# Patient Record
Sex: MALE | Race: WHITE | Employment: FULL TIME | URBAN - METROPOLITAN AREA
[De-identification: names, ages, dates, MRNs, and addresses within clinical notes are randomized per-mention and may not be internally consistent; named-entity substitution may affect disease eponyms.]

---

## 2020-05-23 ENCOUNTER — HOSPITAL ENCOUNTER (EMERGENCY)
Age: 45
Discharge: HOME | End: 2020-05-23
Attending: EMERGENCY MEDICINE
Payer: COMMERCIAL

## 2020-05-23 ENCOUNTER — HOSPITAL ENCOUNTER (EMERGENCY)
Age: 45
Discharge: HOME | End: 2020-05-23
Attending: NURSE PRACTITIONER
Payer: COMMERCIAL

## 2020-05-23 VITALS
HEART RATE: 75 BPM | OXYGEN SATURATION: 98 % | DIASTOLIC BLOOD PRESSURE: 80 MMHG | BODY MASS INDEX: 27.37 KG/M2 | TEMPERATURE: 97.9 F | SYSTOLIC BLOOD PRESSURE: 119 MMHG | WEIGHT: 180 LBS | RESPIRATION RATE: 16 BRPM

## 2020-05-23 VITALS
RESPIRATION RATE: 20 BRPM | OXYGEN SATURATION: 98 % | BODY MASS INDEX: 27.37 KG/M2 | SYSTOLIC BLOOD PRESSURE: 141 MMHG | DIASTOLIC BLOOD PRESSURE: 89 MMHG | HEART RATE: 98 BPM | TEMPERATURE: 98.5 F | WEIGHT: 180 LBS

## 2020-05-23 PROCEDURE — 6370000000 HC RX 637 (ALT 250 FOR IP): Performed by: EMERGENCY MEDICINE

## 2020-05-23 PROCEDURE — 99212 OFFICE O/P EST SF 10 MIN: CPT

## 2020-05-23 PROCEDURE — 93005 ELECTROCARDIOGRAM TRACING: CPT | Performed by: EMERGENCY MEDICINE

## 2020-05-23 PROCEDURE — 99284 EMERGENCY DEPT VISIT MOD MDM: CPT

## 2020-05-23 RX ORDER — LORAZEPAM 1 MG/1
1 TABLET ORAL ONCE
Status: COMPLETED | OUTPATIENT
Start: 2020-05-23 | End: 2020-05-23

## 2020-05-23 RX ADMIN — LORAZEPAM 1 MG: 1 TABLET ORAL at 12:25

## 2020-05-23 ASSESSMENT — ENCOUNTER SYMPTOMS
ABDOMINAL PAIN: 0
BACK PAIN: 0
COUGH: 0
SHORTNESS OF BREATH: 0

## 2020-05-24 LAB
EKG ATRIAL RATE: 71 BPM
EKG P AXIS: 63 DEGREES
EKG P-R INTERVAL: 140 MS
EKG Q-T INTERVAL: 386 MS
EKG QRS DURATION: 78 MS
EKG QTC CALCULATION (BAZETT): 419 MS
EKG R AXIS: -19 DEGREES
EKG T AXIS: 14 DEGREES
EKG VENTRICULAR RATE: 71 BPM

## 2020-05-24 PROCEDURE — 93010 ELECTROCARDIOGRAM REPORT: CPT | Performed by: INTERNAL MEDICINE

## 2020-08-27 VITALS
WEIGHT: 171 LBS | RESPIRATION RATE: 16 BRPM | DIASTOLIC BLOOD PRESSURE: 80 MMHG | BODY MASS INDEX: 26 KG/M2 | SYSTOLIC BLOOD PRESSURE: 120 MMHG | HEART RATE: 72 BPM

## 2020-08-27 RX ORDER — ATORVASTATIN CALCIUM 10 MG/1
10 TABLET, FILM COATED ORAL DAILY
COMMUNITY

## 2021-08-21 ENCOUNTER — HOSPITAL ENCOUNTER (EMERGENCY)
Age: 46
Discharge: HOME | End: 2021-08-21
Payer: COMMERCIAL

## 2021-08-21 VITALS
WEIGHT: 185 LBS | OXYGEN SATURATION: 95 % | RESPIRATION RATE: 16 BRPM | DIASTOLIC BLOOD PRESSURE: 71 MMHG | TEMPERATURE: 97.5 F | SYSTOLIC BLOOD PRESSURE: 103 MMHG | BODY MASS INDEX: 28.13 KG/M2 | HEART RATE: 85 BPM

## 2021-08-21 DIAGNOSIS — U07.1 COVID-19 VIRUS INFECTION: Primary | ICD-10-CM

## 2021-08-21 LAB
BACTERIA: ABNORMAL /HPF
BASOPHILS ABSOLUTE: 0.01 E9/L (ref 0–0.2)
BASOPHILS RELATIVE PERCENT: 0.3 % (ref 0–2)
BILIRUBIN URINE: NEGATIVE
BLOOD, URINE: NEGATIVE
CLARITY: CLEAR
COLOR: YELLOW
EOSINOPHILS ABSOLUTE: 0 E9/L (ref 0.05–0.5)
EOSINOPHILS RELATIVE PERCENT: 0 % (ref 0–6)
GFR AFRICAN AMERICAN: >60
GFR NON-AFRICAN AMERICAN: >60 ML/MIN/1.73
GLUCOSE BLD-MCNC: 100 MG/DL (ref 74–99)
GLUCOSE URINE: NEGATIVE MG/DL
HCT VFR BLD CALC: 40.9 % (ref 37–54)
HEMOGLOBIN: 13.7 G/DL (ref 12.5–16.5)
IMMATURE GRANULOCYTES #: 0.01 E9/L
IMMATURE GRANULOCYTES %: 0.3 % (ref 0–5)
KETONES, URINE: >=80 MG/DL
LEUKOCYTE ESTERASE, URINE: NEGATIVE
LYMPHOCYTES ABSOLUTE: 0.54 E9/L (ref 1.5–4)
LYMPHOCYTES RELATIVE PERCENT: 13.7 % (ref 20–42)
MCH RBC QN AUTO: 28.7 PG (ref 26–35)
MCHC RBC AUTO-ENTMCNC: 33.5 % (ref 32–34.5)
MCV RBC AUTO: 85.6 FL (ref 80–99.9)
MONOCYTES ABSOLUTE: 0.24 E9/L (ref 0.1–0.95)
MONOCYTES RELATIVE PERCENT: 6.1 % (ref 2–12)
MUCUS: PRESENT /LPF
NEUTROPHILS ABSOLUTE: 3.14 E9/L (ref 1.8–7.3)
NEUTROPHILS RELATIVE PERCENT: 79.6 % (ref 43–80)
NITRITE, URINE: NEGATIVE
PDW BLD-RTO: 12.1 FL (ref 11.5–15)
PERFORMED ON: ABNORMAL
PH UA: 6 (ref 5–9)
PLATELET # BLD: 162 E9/L (ref 130–450)
PMV BLD AUTO: 9.6 FL (ref 7–12)
POC CHLORIDE: 102 MMOL/L (ref 100–108)
POC CREATININE: 0.8 MG/DL (ref 0.7–1.2)
POC POTASSIUM: 4 MMOL/L (ref 3.5–5)
POC SODIUM: 139 MMOL/L (ref 132–146)
PROTEIN UA: ABNORMAL MG/DL
RBC # BLD: 4.78 E12/L (ref 3.8–5.8)
RBC # BLD: NORMAL 10*6/UL
RBC UA: ABNORMAL /HPF (ref 0–2)
SARS-COV-2, NAAT: DETECTED
SPECIFIC GRAVITY UA: 1.02 (ref 1–1.03)
UROBILINOGEN, URINE: 0.2 E.U./DL
WBC # BLD: 3.9 E9/L (ref 4.5–11.5)
WBC UA: ABNORMAL /HPF (ref 0–5)

## 2021-08-21 PROCEDURE — 81001 URINALYSIS AUTO W/SCOPE: CPT

## 2021-08-21 PROCEDURE — 82565 ASSAY OF CREATININE: CPT

## 2021-08-21 PROCEDURE — 82435 ASSAY OF BLOOD CHLORIDE: CPT

## 2021-08-21 PROCEDURE — 36415 COLL VENOUS BLD VENIPUNCTURE: CPT

## 2021-08-21 PROCEDURE — 84295 ASSAY OF SERUM SODIUM: CPT

## 2021-08-21 PROCEDURE — 82947 ASSAY GLUCOSE BLOOD QUANT: CPT

## 2021-08-21 PROCEDURE — 99211 OFF/OP EST MAY X REQ PHY/QHP: CPT

## 2021-08-21 PROCEDURE — 85025 COMPLETE CBC W/AUTO DIFF WBC: CPT

## 2021-08-21 PROCEDURE — 84132 ASSAY OF SERUM POTASSIUM: CPT

## 2021-08-21 PROCEDURE — 87635 SARS-COV-2 COVID-19 AMP PRB: CPT

## 2021-08-21 RX ORDER — DEXAMETHASONE 6 MG/1
6 TABLET ORAL DAILY
Qty: 5 TABLET | Refills: 0 | Status: ON HOLD | OUTPATIENT
Start: 2021-08-21 | End: 2021-08-28

## 2021-08-21 RX ORDER — AZITHROMYCIN 250 MG/1
TABLET, FILM COATED ORAL
Qty: 1 PACKET | Refills: 0 | Status: ON HOLD | OUTPATIENT
Start: 2021-08-21 | End: 2021-08-28 | Stop reason: HOSPADM

## 2021-08-21 NOTE — ED PROVIDER NOTES
[id number]  Department of Emergency Medicine  UC Encounter Note  21   11:10 AM EDT      NAME: Erica Pimentel  :  1975  MRN:  [id number]    Chief Complaint: Fever (for a week, higher at night low in the day time)      This is a 62-year-old male that presents to urgent care complaining of a persistent fever mostly in the evening times for the past week. He does state some fatigue and may be some mild body aches and did state earlier this week he had a Covid test at a pharmacy which was negative. He really does not complain of any other symptoms at this time. He denies any lightheadedness or dizziness. No chest pain or shortness of breath. No cough or URI symptoms or sinus symptoms. No nausea or vomiting diarrhea or urinary symptoms. On first contact patient he appears to be in no acute distress. Review of Systems  Pertinent positives and negatives are stated within HPI, all other systems reviewed and are negative. Physical Exam  Vitals and nursing note reviewed. Constitutional:       Appearance: He is well-developed. HENT:      Head: Normocephalic and atraumatic. Jaw: There is normal jaw occlusion. No trismus. Right Ear: Hearing, tympanic membrane, ear canal and external ear normal.      Left Ear: Hearing, tympanic membrane, ear canal and external ear normal.      Nose: Nose normal.      Right Sinus: No maxillary sinus tenderness or frontal sinus tenderness. Left Sinus: No maxillary sinus tenderness or frontal sinus tenderness. Mouth/Throat:      Pharynx: Oropharynx is clear. Uvula midline. No pharyngeal swelling, oropharyngeal exudate, posterior oropharyngeal erythema or uvula swelling. Tonsils: No tonsillar abscesses. Eyes:      General: Lids are normal.      Conjunctiva/sclera: Conjunctivae normal.      Pupils: Pupils are equal, round, and reactive to light.    Cardiovascular:      Rate and Rhythm: Normal rate and regular rhythm. Heart sounds: Normal heart sounds. No murmur heard. Pulmonary:      Effort: Pulmonary effort is normal.      Breath sounds: Normal breath sounds. Abdominal:      General: Bowel sounds are normal.      Palpations: Abdomen is soft. Abdomen is not rigid. Tenderness: There is no abdominal tenderness. There is no guarding or rebound. Musculoskeletal:      Cervical back: Normal range of motion and neck supple. Skin:     General: Skin is warm and dry. Findings: No abrasion or rash. Neurological:      Mental Status: He is alert and oriented to person, place, and time. GCS: GCS eye subscore is 4. GCS verbal subscore is 5. GCS motor subscore is 6. Cranial Nerves: No cranial nerve deficit. Sensory: Sensation is intact. No sensory deficit. Motor: Motor function is intact. Coordination: Coordination is intact. Coordination normal.      Gait: Gait is intact. Gait normal.         Procedures    MDM  Number of Diagnoses or Management Options  COVID-19 virus infection  Diagnosis management comments: Patient is in no acute distress. Basic lab work was reviewed. I did run a Covid test which was positive. His white blood count was decreased which corresponds with his positive Covid test.  But he looks well here. I stressed symptomatic treatment. I did add a steroid which would help with the body aches in particular but he was also concern about any worsening symptoms anyone to be covered for anything else so I did write the Decadron to help if he does have any problems breathing and he was worried about a secondary bacterial infection in the lungs which I stated his lungs sounded pretty clear but I will place him on a Zithromax antibiotic for now. I did tell him to follow-up with his primary care provider and to go to the ER if symptoms worsen.              --------------------------------------------- PAST HISTORY ---------------------------------------------  Past Medical History:  has a past medical history of GERD (gastroesophageal reflux disease), History of cardiovascular stress test, and Hyperlipidemia. Past Surgical History:  has no past surgical history on file. Social History:  reports that he has never smoked. He uses smokeless tobacco. He reports that he does not drink alcohol and does not use drugs. Family History: family history includes Coronary Art Dis in his father; Diabetes in his father; Other in his mother. The patients home medications have been reviewed. Allergies: Patient has no known allergies.     -------------------------------------------------- RESULTS -------------------------------------------------  Results for orders placed or performed during the hospital encounter of 08/21/21   COVID-19, Rapid    Specimen: Nasopharyngeal Swab   Result Value Ref Range    SARS-CoV-2, NAAT DETECTED (A) Not Detected   CBC Auto Differential   Result Value Ref Range    WBC 3.9 (L) 4.5 - 11.5 E9/L    RBC 4.78 3.80 - 5.80 E12/L    Hemoglobin 13.7 12.5 - 16.5 g/dL    Hematocrit 40.9 37.0 - 54.0 %    MCV 85.6 80.0 - 99.9 fL    MCH 28.7 26.0 - 35.0 pg    MCHC 33.5 32.0 - 34.5 %    RDW 12.1 11.5 - 15.0 fL    Platelets 431 562 - 602 E9/L    MPV 9.6 7.0 - 12.0 fL   Urinalysis   Result Value Ref Range    Color, UA Yellow Straw/Yellow    Clarity, UA Clear Clear    Glucose, Ur Negative Negative mg/dL    Bilirubin Urine Negative Negative    Ketones, Urine >=80 (A) Negative mg/dL    Specific Gravity, UA 1.020 1.005 - 1.030    Blood, Urine Negative Negative    pH, UA 6.0 5.0 - 9.0    Protein, UA TRACE Negative mg/dL    Urobilinogen, Urine 0.2 <2.0 E.U./dL    Nitrite, Urine Negative Negative    Leukocyte Esterase, Urine Negative Negative   Microscopic Urinalysis   Result Value Ref Range    Mucus, UA Present (A) None Seen /LPF    WBC, UA 2-5 0 - 5 /HPF    RBC, UA NONE 0 - 2 /HPF    Bacteria, UA NONE SEEN None Seen /HPF   POCT Venous   Result Value Ref Range    POC Sodium 139 132 - 146 mmol/L    POC Potassium 4.0 3.5 - 5.0 mmol/L    POC Chloride 102 100 - 108 mmol/L    POC Glucose 100 (H) 74 - 99 mg/dl    POC Creatinine 0.8 0.7 - 1.2 mg/dL    GFR Non-African American >60 >=60 mL/min/1.73    GFR  >60     Performed on SEE BELOW      No orders to display       ------------------------- NURSING NOTES AND VITALS REVIEWED ---------------------------   The nursing notes within the ED encounter and vital signs as below have been reviewed. /71   Pulse 85   Temp 97.5 °F (36.4 °C)   Resp 16   Wt 185 lb (83.9 kg)   SpO2 95%   BMI 28.13 kg/m²   Oxygen Saturation Interpretation: Normal      ------------------------------------------ PROGRESS NOTES ------------------------------------------   I have spoken with the patient and discussed todays results, in addition to providing specific details for the plan of care and counseling regarding the diagnosis and prognosis. Their questions are answered at this time and they are agreeable with the plan.      --------------------------------- ADDITIONAL PROVIDER NOTES ---------------------------------     This patient is stable for discharge. I have shared the specific conditions for return, as well as the importance of follow-up. * NOTE: This report was transcribed using voice recognition software.  Every effort was made to ensure accuracy; however, inadvertent computerized transcription errors may be present.    --------------------------------- IMPRESSION AND DISPOSITION ---------------------------------    IMPRESSION  1. COVID-19 virus infection        DISPOSITION  Disposition: Discharge to home  Patient condition is good       Chinmarquita Singh PA-C  08/21/21 9989

## 2021-08-23 ENCOUNTER — CARE COORDINATION (OUTPATIENT)
Dept: CARE COORDINATION | Age: 46
End: 2021-08-23

## 2021-08-23 ENCOUNTER — TELEPHONE (OUTPATIENT)
Dept: FAMILY MEDICINE CLINIC | Age: 46
End: 2021-08-23

## 2021-08-23 NOTE — TELEPHONE ENCOUNTER
----- Message from Neliori Epperson sent at 8/23/2021 10:25 AM EDT -----  Subject: Message to Provider    QUESTIONS  Information for Provider? pt called in to let Dr Taylor Lipscomb know he has   tested positive for covid on 8/21. pt had symptoms since monday. he said   he only had a fever, and can't seem to break it. pt unsure if he needs to   do anything more. looking for guidance on what he should be doing,   ---------------------------------------------------------------------------  --------------  CALL BACK INFO  What is the best way for the office to contact you? OK to leave message on   voicemail, OK to respond with electronic message via [employer] portal (only   for patients who have registered [employer] account)  Preferred Call Back Phone Number? [phone]  ---------------------------------------------------------------------------  --------------  SCRIPT ANSWERS  Relationship to Patient?  Self

## 2021-08-23 NOTE — CARE COORDINATION
Date/Time:  8/23/2021 9:29 AM  Attempted to reach patient by telephone. Call within 2 business days of discharge: Yes Left HIPPA compliant message requesting a return call. Will attempt to reach patient again.

## 2021-08-23 NOTE — CARE COORDINATION
Patient contacted regarding COVID-19 risk, exposure, diagnosis, pulse oximeter ordered at discharge and monoclonal antibody infusion follow up. Discussed COVID-19 related testing which was available at this time. Test results were positive. Patient informed of results, if available? Yes. LPN Care Coordinator contacted the patient by telephone to perform post discharge assessment. Call within 2 business days of discharge: Yes. Verified name and  with patient as identifiers. Provided introduction to self, and explanation of the CTN/ACM role, and reason for call due to risk factors for infection and/or exposure to COVID-19. Symptoms reviewed with patient who verbalized the following symptoms: fever, fatigue and pain or aching joints. Due to no new or worsening symptoms encounter was not routed to provider for escalation. Discussed follow-up appointments. If no appointment was previously scheduled, appointment scheduling offered: Yes. [facility] follow up appointment(s): No future appointments. Non-[facility] follow up appointment(s): pt states he is still having a fever was advised to call pcp     Non-face-to-face services provided:  Obtained and reviewed discharge summary and/or continuity of care documents     Advance Care Planning:   Does patient have an Advance Directive:  reviewed and needs to be updated. Educated patient about risk for severe COVID-19 due to risk factors according to CDC guidelines. LPN CC reviewed discharge instructions, medical action plan and red flag symptoms with the patient who verbalized understanding. Discussed COVID vaccination status: No. Education provided on COVID-19 vaccination as appropriate. Discussed exposure protocols and quarantine with CDC Guidelines. Patient was given an opportunity to verbalize any questions and concerns and agrees to contact ACM or health care provider for questions related to their healthcare.     Reviewed and educated patient on any new and changed medications related to discharge diagnosis     Was patient discharged with a pulse oximeter? No Discussed and confirmed pulse oximeter discharge instructions and when to notify provider or seek emergency care. LPN CC provided contact information. Plan for follow-up call in 1-2 days based on severity of symptoms and risk factors.

## 2021-08-23 NOTE — TELEPHONE ENCOUNTER
I spoke to patient, he denies any shortness of breath, denies any cough, no nausea vomiting diarrhea,  Advised to take vitamin C, zinc, Tylenol, fluids,  He has ordered the pulse ox  Go to ER if O2 sat less than 88%, or shortness of breath,  Or high fevers not improving

## 2021-08-24 ENCOUNTER — APPOINTMENT (OUTPATIENT)
Dept: CT IMAGING | Age: 46
DRG: 137 | End: 2021-08-24
Payer: COMMERCIAL

## 2021-08-24 ENCOUNTER — HOSPITAL ENCOUNTER (INPATIENT)
Age: 46
LOS: 4 days | Discharge: HOME | DRG: 137 | End: 2021-08-28
Attending: EMERGENCY MEDICINE | Admitting: INTERNAL MEDICINE
Payer: COMMERCIAL

## 2021-08-24 DIAGNOSIS — U07.1 COVID-19: Primary | ICD-10-CM

## 2021-08-24 DIAGNOSIS — J18.9 PNEUMONIA DUE TO INFECTIOUS ORGANISM, UNSPECIFIED LATERALITY, UNSPECIFIED PART OF LUNG: ICD-10-CM

## 2021-08-24 DIAGNOSIS — R09.02 HYPOXIA: ICD-10-CM

## 2021-08-24 DIAGNOSIS — U07.1 COVID-19 VIRUS INFECTION: ICD-10-CM

## 2021-08-24 PROBLEM — J12.82 PNEUMONIA DUE TO COVID-19 VIRUS: Status: ACTIVE | Noted: 2021-08-24

## 2021-08-24 LAB
ALBUMIN SERPL-MCNC: 3.6 G/DL (ref 3.5–5.2)
ALP BLD-CCNC: 64 U/L (ref 40–129)
ALT SERPL-CCNC: 61 U/L (ref 0–40)
ANION GAP SERPL CALCULATED.3IONS-SCNC: 12 MMOL/L (ref 7–16)
APTT: 29.6 SEC (ref 24.5–35.1)
AST SERPL-CCNC: 44 U/L (ref 0–39)
BASOPHILS ABSOLUTE: 0 E9/L (ref 0–0.2)
BASOPHILS RELATIVE PERCENT: 0.2 % (ref 0–2)
BILIRUB SERPL-MCNC: 0.4 MG/DL (ref 0–1.2)
BUN BLDV-MCNC: 8 MG/DL (ref 6–20)
BURR CELLS: ABNORMAL
CALCIUM SERPL-MCNC: 8.3 MG/DL (ref 8.6–10.2)
CHLORIDE BLD-SCNC: 97 MMOL/L (ref 98–107)
CO2: 26 MMOL/L (ref 22–29)
CREAT SERPL-MCNC: 0.8 MG/DL (ref 0.7–1.2)
DOHLE BODIES: ABNORMAL
EOSINOPHILS ABSOLUTE: 0 E9/L (ref 0.05–0.5)
EOSINOPHILS RELATIVE PERCENT: 0 % (ref 0–6)
GFR AFRICAN AMERICAN: >60
GFR NON-AFRICAN AMERICAN: >60 ML/MIN/1.73
GLUCOSE BLD-MCNC: 111 MG/DL (ref 74–99)
HCT VFR BLD CALC: 39.7 % (ref 37–54)
HEMOGLOBIN: 13.7 G/DL (ref 12.5–16.5)
INR BLD: 1.1
LACTIC ACID, SEPSIS: 1.1 MMOL/L (ref 0.5–1.9)
LYMPHOCYTES ABSOLUTE: 0.2 E9/L (ref 1.5–4)
LYMPHOCYTES RELATIVE PERCENT: 3.5 % (ref 20–42)
MAGNESIUM: 2.1 MG/DL (ref 1.6–2.6)
MCH RBC QN AUTO: 29.4 PG (ref 26–35)
MCHC RBC AUTO-ENTMCNC: 34.5 % (ref 32–34.5)
MCV RBC AUTO: 85.2 FL (ref 80–99.9)
MONOCYTES ABSOLUTE: 0.2 E9/L (ref 0.1–0.95)
MONOCYTES RELATIVE PERCENT: 4.3 % (ref 2–12)
NEUTROPHILS ABSOLUTE: 4.69 E9/L (ref 1.8–7.3)
NEUTROPHILS RELATIVE PERCENT: 92.2 % (ref 43–80)
OVALOCYTES: ABNORMAL
PDW BLD-RTO: 12.1 FL (ref 11.5–15)
PLATELET # BLD: 262 E9/L (ref 130–450)
PMV BLD AUTO: 9.7 FL (ref 7–12)
POIKILOCYTES: ABNORMAL
POTASSIUM SERPL-SCNC: 3.7 MMOL/L (ref 3.5–5)
PROTHROMBIN TIME: 12.9 SEC (ref 9.3–12.4)
RBC # BLD: 4.66 E12/L (ref 3.8–5.8)
SODIUM BLD-SCNC: 135 MMOL/L (ref 132–146)
SPHEROCYTES: ABNORMAL
TOTAL PROTEIN: 6.5 G/DL (ref 6.4–8.3)
TROPONIN, HIGH SENSITIVITY: 7 NG/L (ref 0–11)
WBC # BLD: 5.1 E9/L (ref 4.5–11.5)

## 2021-08-24 PROCEDURE — 2580000003 HC RX 258: Performed by: EMERGENCY MEDICINE

## 2021-08-24 PROCEDURE — 6360000004 HC RX CONTRAST MEDICATION: Performed by: RADIOLOGY

## 2021-08-24 PROCEDURE — 6370000000 HC RX 637 (ALT 250 FOR IP): Performed by: EMERGENCY MEDICINE

## 2021-08-24 PROCEDURE — 96361 HYDRATE IV INFUSION ADD-ON: CPT

## 2021-08-24 PROCEDURE — 84484 ASSAY OF TROPONIN QUANT: CPT

## 2021-08-24 PROCEDURE — 99222 1ST HOSP IP/OBS MODERATE 55: CPT | Performed by: INTERNAL MEDICINE

## 2021-08-24 PROCEDURE — 94664 DEMO&/EVAL PT USE INHALER: CPT

## 2021-08-24 PROCEDURE — 99284 EMERGENCY DEPT VISIT MOD MDM: CPT

## 2021-08-24 PROCEDURE — 83735 ASSAY OF MAGNESIUM: CPT

## 2021-08-24 PROCEDURE — 87040 BLOOD CULTURE FOR BACTERIA: CPT

## 2021-08-24 PROCEDURE — 6360000002 HC RX W HCPCS: Performed by: EMERGENCY MEDICINE

## 2021-08-24 PROCEDURE — 85025 COMPLETE CBC W/AUTO DIFF WBC: CPT

## 2021-08-24 PROCEDURE — 2060000000 HC ICU INTERMEDIATE R&B

## 2021-08-24 PROCEDURE — 85610 PROTHROMBIN TIME: CPT

## 2021-08-24 PROCEDURE — 83605 ASSAY OF LACTIC ACID: CPT

## 2021-08-24 PROCEDURE — 94640 AIRWAY INHALATION TREATMENT: CPT

## 2021-08-24 PROCEDURE — 85730 THROMBOPLASTIN TIME PARTIAL: CPT

## 2021-08-24 PROCEDURE — 96374 THER/PROPH/DIAG INJ IV PUSH: CPT

## 2021-08-24 PROCEDURE — 93005 ELECTROCARDIOGRAM TRACING: CPT | Performed by: EMERGENCY MEDICINE

## 2021-08-24 PROCEDURE — 80053 COMPREHEN METABOLIC PANEL: CPT

## 2021-08-24 PROCEDURE — 71275 CT ANGIOGRAPHY CHEST: CPT

## 2021-08-24 RX ORDER — ACETAMINOPHEN 325 MG/1
650 TABLET ORAL ONCE
Status: COMPLETED | OUTPATIENT
Start: 2021-08-24 | End: 2021-08-24

## 2021-08-24 RX ORDER — ATORVASTATIN CALCIUM 20 MG/1
10 TABLET, FILM COATED ORAL NIGHTLY
Status: DISCONTINUED | OUTPATIENT
Start: 2021-08-24 | End: 2021-08-28 | Stop reason: HOSPADM

## 2021-08-24 RX ORDER — 0.9 % SODIUM CHLORIDE 0.9 %
30 INTRAVENOUS SOLUTION INTRAVENOUS ONCE
Status: COMPLETED | OUTPATIENT
Start: 2021-08-24 | End: 2021-08-24

## 2021-08-24 RX ORDER — ONDANSETRON 2 MG/ML
4 INJECTION INTRAMUSCULAR; INTRAVENOUS EVERY 6 HOURS PRN
Status: DISCONTINUED | OUTPATIENT
Start: 2021-08-24 | End: 2021-08-28 | Stop reason: HOSPADM

## 2021-08-24 RX ORDER — DEXAMETHASONE SODIUM PHOSPHATE 10 MG/ML
6 INJECTION, SOLUTION INTRAMUSCULAR; INTRAVENOUS ONCE
Status: COMPLETED | OUTPATIENT
Start: 2021-08-25 | End: 2021-08-25

## 2021-08-24 RX ORDER — ACETAMINOPHEN 650 MG/1
650 SUPPOSITORY RECTAL EVERY 6 HOURS PRN
Status: DISCONTINUED | OUTPATIENT
Start: 2021-08-24 | End: 2021-08-25 | Stop reason: SDUPTHER

## 2021-08-24 RX ORDER — ONDANSETRON 4 MG/1
4 TABLET, ORALLY DISINTEGRATING ORAL EVERY 8 HOURS PRN
Status: DISCONTINUED | OUTPATIENT
Start: 2021-08-24 | End: 2021-08-28 | Stop reason: HOSPADM

## 2021-08-24 RX ORDER — ACETAMINOPHEN 325 MG/1
650 TABLET ORAL EVERY 6 HOURS PRN
Status: DISCONTINUED | OUTPATIENT
Start: 2021-08-24 | End: 2021-08-25 | Stop reason: SDUPTHER

## 2021-08-24 RX ORDER — IPRATROPIUM BROMIDE AND ALBUTEROL SULFATE 2.5; .5 MG/3ML; MG/3ML
2 SOLUTION RESPIRATORY (INHALATION) ONCE
Status: COMPLETED | OUTPATIENT
Start: 2021-08-24 | End: 2021-08-24

## 2021-08-24 RX ORDER — SODIUM CHLORIDE 0.9 % (FLUSH) 0.9 %
5-40 SYRINGE (ML) INJECTION EVERY 12 HOURS SCHEDULED
Status: DISCONTINUED | OUTPATIENT
Start: 2021-08-24 | End: 2021-08-28 | Stop reason: HOSPADM

## 2021-08-24 RX ORDER — SODIUM CHLORIDE 9 MG/ML
25 INJECTION, SOLUTION INTRAVENOUS PRN
Status: DISCONTINUED | OUTPATIENT
Start: 2021-08-24 | End: 2021-08-28 | Stop reason: HOSPADM

## 2021-08-24 RX ORDER — POLYETHYLENE GLYCOL 3350 17 G/17G
17 POWDER, FOR SOLUTION ORAL DAILY PRN
Status: DISCONTINUED | OUTPATIENT
Start: 2021-08-24 | End: 2021-08-28 | Stop reason: HOSPADM

## 2021-08-24 RX ORDER — SODIUM CHLORIDE 0.9 % (FLUSH) 0.9 %
5-40 SYRINGE (ML) INJECTION PRN
Status: DISCONTINUED | OUTPATIENT
Start: 2021-08-24 | End: 2021-08-28 | Stop reason: HOSPADM

## 2021-08-24 RX ORDER — DEXAMETHASONE SODIUM PHOSPHATE 10 MG/ML
10 INJECTION, SOLUTION INTRAMUSCULAR; INTRAVENOUS ONCE
Status: COMPLETED | OUTPATIENT
Start: 2021-08-24 | End: 2021-08-24

## 2021-08-24 RX ADMIN — SODIUM CHLORIDE 2448 ML: 9 INJECTION, SOLUTION INTRAVENOUS at 17:44

## 2021-08-24 RX ADMIN — AZITHROMYCIN 500 MG: 500 INJECTION, POWDER, LYOPHILIZED, FOR SOLUTION INTRAVENOUS at 23:10

## 2021-08-24 RX ADMIN — DEXAMETHASONE SODIUM PHOSPHATE 10 MG: 10 INJECTION, SOLUTION INTRAMUSCULAR; INTRAVENOUS at 17:46

## 2021-08-24 RX ADMIN — IPRATROPIUM BROMIDE AND ALBUTEROL SULFATE 2 AMPULE: .5; 3 SOLUTION RESPIRATORY (INHALATION) at 18:09

## 2021-08-24 RX ADMIN — WATER 2000 MG: 1 INJECTION INTRAMUSCULAR; INTRAVENOUS; SUBCUTANEOUS at 22:55

## 2021-08-24 RX ADMIN — ACETAMINOPHEN 650 MG: 325 TABLET ORAL at 17:59

## 2021-08-24 RX ADMIN — IOPAMIDOL 75 ML: 755 INJECTION, SOLUTION INTRAVENOUS at 19:38

## 2021-08-24 ASSESSMENT — ENCOUNTER SYMPTOMS
VOMITING: 0
SORE THROAT: 0
RHINORRHEA: 0
WHEEZING: 0
SINUS PRESSURE: 0
ABDOMINAL PAIN: 0
NAUSEA: 1
COUGH: 1
BACK PAIN: 0
DIARRHEA: 0
SHORTNESS OF BREATH: 0

## 2021-08-24 ASSESSMENT — PAIN SCALES - GENERAL: PAINLEVEL_OUTOF10: 0

## 2021-08-24 NOTE — ED PROVIDER NOTES
Chief complaint:  Fever    HPI History provided by the patient  Patient comes in complaining of 1 week of mild cough with nausea and dry heaves with persistent fevers, not staying down at home. Has body aches and general fatigue with it. No abdominal pain. No specific chest pain or shortness of breath. No headache or stiff neck. Tested positive for Covid 3 days ago. Unvaccinated. No particular treatment prior to arrival.  States he really does not feel short of breath is more fatigued. No leg pain or swelling. No history of blood clots. Review of Systems   Constitutional: Positive for chills, diaphoresis, fatigue and fever. HENT: Positive for congestion. Negative for ear pain, rhinorrhea, sinus pressure and sore throat. Respiratory: Positive for cough. Negative for shortness of breath and wheezing. Cardiovascular: Negative for chest pain, palpitations and leg swelling. Gastrointestinal: Positive for nausea. Negative for abdominal pain, diarrhea and vomiting. Genitourinary: Negative for dysuria, flank pain, frequency and hematuria. Musculoskeletal: Positive for myalgias. Negative for arthralgias, back pain, gait problem, joint swelling, neck pain and neck stiffness. Skin: Negative for rash and wound. Neurological: Negative for dizziness, seizures, syncope, weakness, light-headedness, numbness and headaches. Hematological: Negative for adenopathy. All other systems reviewed and are negative. Physical Exam  Vitals and nursing note reviewed. Constitutional:       General: He is not in acute distress. Appearance: He is well-developed. He is not ill-appearing, toxic-appearing or diaphoretic. HENT:      Head: Normocephalic and atraumatic. Nose: Mucosal edema present. Mouth/Throat:      Pharynx: Oropharynx is clear. Uvula midline. No pharyngeal swelling, oropharyngeal exudate, posterior oropharyngeal erythema or uvula swelling.       Tonsils: No tonsillar exudate or tonsillar abscesses. Comments: No trismus or stridor  Eyes:      Pupils: Pupils are equal, round, and reactive to light. Neck:      Trachea: Trachea and phonation normal.      Comments: No trismus, no stridor, no adenopathy or meningeal signs. Cardiovascular:      Rate and Rhythm: Regular rhythm. Tachycardia present. Heart sounds: Normal heart sounds. No murmur heard. Pulmonary:      Effort: Pulmonary effort is normal. No respiratory distress. Breath sounds: Normal breath sounds. No stridor, decreased air movement or transmitted upper airway sounds. No decreased breath sounds, wheezing, rhonchi or rales. Comments: Mild scattered adventitial sounds with no respiratory distress. Chest:      Chest wall: No tenderness. Abdominal:      General: Bowel sounds are normal. There is no distension. Palpations: Abdomen is soft. Tenderness: There is no abdominal tenderness. There is no right CVA tenderness, left CVA tenderness, guarding or rebound. Musculoskeletal:         General: No swelling, tenderness, deformity or signs of injury. Cervical back: Normal range of motion and neck supple. No signs of trauma or rigidity. No pain with movement, spinous process tenderness or muscular tenderness. Normal range of motion. Right lower leg: No edema. Left lower leg: No edema. Comments: No pretibial edema or calf pain. Lymphadenopathy:      Cervical: No cervical adenopathy. Skin:     General: Skin is warm and dry. Coloration: Skin is not jaundiced or pale. Findings: No erythema or rash. Neurological:      General: No focal deficit present. Mental Status: He is alert and oriented to person, place, and time. GCS: GCS eye subscore is 4. GCS verbal subscore is 5. GCS motor subscore is 6. Cranial Nerves: No cranial nerve deficit.       Coordination: Coordination normal.          Procedures     [facility]     ED Course as of Aug 24 2108   Tue Aug 24, 2021 2017 Patient up and ambulated to the restroom in no acute distress. [NC]   2027 Patient laying the bed resting comfortably no acute distress at this time although is on nasal cannula oxygen with a pulse ox of about 93%. He is speaking in full sentences. He is updated on work-up results and admission. [NC]   2107 Case discussed with Dr. Stephy Harding, detailed overview given, they will admit the patient. [NC]      ED Course User Index  [NC] Gerry Schofield DO     EKG Interpretation    Interpreted by emergency department physician    Rhythm: normal sinus   Rate: 94  Axis: normal  Ectopy: none  Conduction: normal  ST Segments: no acute change  T Waves: no acute change  Q Waves: none    Clinical Impression: no acute changes    Gerry Schofield DO    ED Course as of Aug 24 2109   Tue Aug 24, 2021   2017 Patient up and ambulated to the restroom in no acute distress. [NC]   2027 Patient laying the bed resting comfortably no acute distress at this time although is on nasal cannula oxygen with a pulse ox of about 93%. He is speaking in full sentences. He is updated on work-up results and admission. [NC]   2107 Case discussed with Dr. Stephy Harding, detailed overview given, they will admit the patient. [NC]      ED Course User Index  [NC] Melany Nixon, DO       --------------------------------------------- PAST HISTORY ---------------------------------------------  Past Medical History:  has a past medical history of GERD (gastroesophageal reflux disease), History of cardiovascular stress test, and Hyperlipidemia. Past Surgical History:  has no past surgical history on file. Social History:  reports that he has never smoked. He uses smokeless tobacco. He reports that he does not drink alcohol and does not use drugs. Family History: family history includes Coronary Art Dis in his father; Diabetes in his father; Other in his mother.      The patients home medications have been reviewed. Allergies: Patient has no known allergies.     -------------------------------------------------- RESULTS -------------------------------------------------    LABS:  Results for orders placed or performed during the hospital encounter of 08/24/21   Lactate, Sepsis   Result Value Ref Range    Lactic Acid, Sepsis 1.1 0.5 - 1.9 mmol/L   CBC Auto Differential   Result Value Ref Range    WBC 5.1 4.5 - 11.5 E9/L    RBC 4.66 3.80 - 5.80 E12/L    Hemoglobin 13.7 12.5 - 16.5 g/dL    Hematocrit 39.7 37.0 - 54.0 %    MCV 85.2 80.0 - 99.9 fL    MCH 29.4 26.0 - 35.0 pg    MCHC 34.5 32.0 - 34.5 %    RDW 12.1 11.5 - 15.0 fL    Platelets 109 804 - 154 E9/L    MPV 9.7 7.0 - 12.0 fL    Neutrophils % 92.2 (H) 43.0 - 80.0 %    Lymphocytes % 3.5 (L) 20.0 - 42.0 %    Monocytes % 4.3 2.0 - 12.0 %    Eosinophils % 0.0 0.0 - 6.0 %    Basophils % 0.2 0.0 - 2.0 %    Neutrophils Absolute 4.69 1.80 - 7.30 E9/L    Lymphocytes Absolute 0.20 (L) 1.50 - 4.00 E9/L    Monocytes Absolute 0.20 0.10 - 0.95 E9/L    Eosinophils Absolute 0.00 (L) 0.05 - 0.50 E9/L    Basophils Absolute 0.00 0.00 - 0.20 E9/L    Dohle Bodies 1+     Poikilocytes 1+     Ronny Cells 1+     Ovalocytes 1+     Spherocytes 1+    Comprehensive Metabolic Panel   Result Value Ref Range    Sodium 135 132 - 146 mmol/L    Potassium 3.7 3.5 - 5.0 mmol/L    Chloride 97 (L) 98 - 107 mmol/L    CO2 26 22 - 29 mmol/L    Anion Gap 12 7 - 16 mmol/L    Glucose 111 (H) 74 - 99 mg/dL    BUN 8 6 - 20 mg/dL    CREATININE 0.8 0.7 - 1.2 mg/dL    GFR Non-African American >60 >=60 mL/min/1.73    GFR African American >60     Calcium 8.3 (L) 8.6 - 10.2 mg/dL    Total Protein 6.5 6.4 - 8.3 g/dL    Albumin 3.6 3.5 - 5.2 g/dL    Total Bilirubin 0.4 0.0 - 1.2 mg/dL    Alkaline Phosphatase 64 40 - 129 U/L    ALT 61 (H) 0 - 40 U/L    AST 44 (H) 0 - 39 U/L   Protime-INR   Result Value Ref Range    Protime 12.9 (H) 9.3 - 12.4 sec    INR 1.1    APTT   Result Value Ref Range    aPTT 29.6 24.5 - 35.1 sec Magnesium   Result Value Ref Range    Magnesium 2.1 1.6 - 2.6 mg/dL   Troponin   Result Value Ref Range    Troponin, High Sensitivity 7 0 - 11 ng/L       RADIOLOGY:  CTA CHEST W CONTRAST   Final Result   No evidence of pulmonary embolism. Bilateral pneumonia, worse on the left.                 ------------------------- NURSING NOTES AND VITALS REVIEWED ---------------------------  Date / Time Roomed:  8/24/2021  4:28 PM  ED Bed Assignment:  16/16    The nursing notes within the ED encounter and vital signs as below have been reviewed. Patient Vitals for the past 24 hrs:   BP Temp Temp src Pulse Resp SpO2 Height Weight   08/24/21 1749 132/83 103 °F (39.4 °C) Oral 103 16 93 %     08/24/21 1608       5' 8\" (1.727 m) 180 lb (81.6 kg)   08/24/21 1607 (!) 146/78   105 18 (!) 88 %     08/24/21 1604  99.1 °F (37.3 °C) Infrared 108  92 %         Oxygen Saturation Interpretation: Abnormal and Improved after treatment    ------------------------------------------ PROGRESS NOTES ------------------------------------------      Counseling:  I have spoken with the patient and discussed todays results, in addition to providing specific details for the plan of care and counseling regarding the diagnosis and prognosis. Their questions are answered at this time and they are agreeable with the plan of admission.    --------------------------------- ADDITIONAL PROVIDER NOTES ---------------------------------    This patient's ED course included: a personal history and physicial examination, re-evaluation prior to disposition, multiple bedside re-evaluations, IV medications, cardiac monitoring and continuous pulse oximetry    This patient has remained hemodynamically stable during their ED course. Diagnosis:  1. COVID-19    2. Pneumonia due to infectious organism, unspecified laterality, unspecified part of lung    3.  Hypoxia        Disposition:  Patient's disposition: Admit to IMCU  Patient's condition is

## 2021-08-25 PROBLEM — J96.01 ACUTE HYPOXEMIC RESPIRATORY FAILURE DUE TO COVID-19 (HCC): Status: ACTIVE | Noted: 2021-08-25

## 2021-08-25 PROBLEM — U07.1 ACUTE HYPOXEMIC RESPIRATORY FAILURE DUE TO COVID-19 (HCC): Status: ACTIVE | Noted: 2021-08-25

## 2021-08-25 LAB
ANION GAP SERPL CALCULATED.3IONS-SCNC: 12 MMOL/L (ref 7–16)
BUN BLDV-MCNC: 9 MG/DL (ref 6–20)
CALCIUM SERPL-MCNC: 8.1 MG/DL (ref 8.6–10.2)
CHLORIDE BLD-SCNC: 102 MMOL/L (ref 98–107)
CO2: 21 MMOL/L (ref 22–29)
CREAT SERPL-MCNC: 0.7 MG/DL (ref 0.7–1.2)
EKG ATRIAL RATE: 94 BPM
EKG P AXIS: 33 DEGREES
EKG P-R INTERVAL: 120 MS
EKG Q-T INTERVAL: 366 MS
EKG QRS DURATION: 76 MS
EKG QTC CALCULATION (BAZETT): 457 MS
EKG R AXIS: -4 DEGREES
EKG T AXIS: 8 DEGREES
EKG VENTRICULAR RATE: 94 BPM
GFR AFRICAN AMERICAN: >60
GFR NON-AFRICAN AMERICAN: >60 ML/MIN/1.73
GLUCOSE BLD-MCNC: 152 MG/DL (ref 74–99)
HCT VFR BLD CALC: 35.1 % (ref 37–54)
HEMOGLOBIN: 11.9 G/DL (ref 12.5–16.5)
MCH RBC QN AUTO: 29.1 PG (ref 26–35)
MCHC RBC AUTO-ENTMCNC: 33.9 % (ref 32–34.5)
MCV RBC AUTO: 85.8 FL (ref 80–99.9)
PDW BLD-RTO: 12.1 FL (ref 11.5–15)
PLATELET # BLD: 253 E9/L (ref 130–450)
PMV BLD AUTO: 9.7 FL (ref 7–12)
POTASSIUM REFLEX MAGNESIUM: 3.8 MMOL/L (ref 3.5–5)
PROCALCITONIN: 0.14 NG/ML (ref 0–0.08)
RBC # BLD: 4.09 E12/L (ref 3.8–5.8)
SODIUM BLD-SCNC: 135 MMOL/L (ref 132–146)
WBC # BLD: 3 E9/L (ref 4.5–11.5)

## 2021-08-25 PROCEDURE — 85027 COMPLETE CBC AUTOMATED: CPT

## 2021-08-25 PROCEDURE — 2580000003 HC RX 258: Performed by: INTERNAL MEDICINE

## 2021-08-25 PROCEDURE — 6360000002 HC RX W HCPCS: Performed by: INTERNAL MEDICINE

## 2021-08-25 PROCEDURE — 2060000000 HC ICU INTERMEDIATE R&B

## 2021-08-25 PROCEDURE — 2500000003 HC RX 250 WO HCPCS: Performed by: INTERNAL MEDICINE

## 2021-08-25 PROCEDURE — 6370000000 HC RX 637 (ALT 250 FOR IP): Performed by: INTERNAL MEDICINE

## 2021-08-25 PROCEDURE — 86140 C-REACTIVE PROTEIN: CPT

## 2021-08-25 PROCEDURE — 84145 PROCALCITONIN (PCT): CPT

## 2021-08-25 PROCEDURE — 80048 BASIC METABOLIC PNL TOTAL CA: CPT

## 2021-08-25 PROCEDURE — 36415 COLL VENOUS BLD VENIPUNCTURE: CPT

## 2021-08-25 RX ORDER — GUAIFENESIN/DEXTROMETHORPHAN 100-10MG/5
5 SYRUP ORAL EVERY 4 HOURS PRN
Status: DISCONTINUED | OUTPATIENT
Start: 2021-08-25 | End: 2021-08-28 | Stop reason: HOSPADM

## 2021-08-25 RX ORDER — ASCORBIC ACID 500 MG
500 TABLET ORAL 2 TIMES DAILY
Status: DISCONTINUED | OUTPATIENT
Start: 2021-08-25 | End: 2021-08-28 | Stop reason: HOSPADM

## 2021-08-25 RX ORDER — SODIUM CHLORIDE 9 MG/ML
INJECTION, SOLUTION INTRAVENOUS CONTINUOUS
Status: ACTIVE | OUTPATIENT
Start: 2021-08-25 | End: 2021-08-25

## 2021-08-25 RX ORDER — ZINC SULFATE 50(220)MG
50 CAPSULE ORAL DAILY
Status: DISCONTINUED | OUTPATIENT
Start: 2021-08-25 | End: 2021-08-28 | Stop reason: HOSPADM

## 2021-08-25 RX ORDER — BUDESONIDE AND FORMOTEROL FUMARATE DIHYDRATE 160; 4.5 UG/1; UG/1
2 AEROSOL RESPIRATORY (INHALATION) 2 TIMES DAILY
Status: DISCONTINUED | OUTPATIENT
Start: 2021-08-25 | End: 2021-08-28 | Stop reason: HOSPADM

## 2021-08-25 RX ORDER — LANOLIN ALCOHOL/MO/W.PET/CERES
50 CREAM (GRAM) TOPICAL DAILY
Status: DISCONTINUED | OUTPATIENT
Start: 2021-08-25 | End: 2021-08-28 | Stop reason: HOSPADM

## 2021-08-25 RX ORDER — ACETAMINOPHEN 650 MG/1
650 SUPPOSITORY RECTAL EVERY 6 HOURS PRN
Status: DISCONTINUED | OUTPATIENT
Start: 2021-08-25 | End: 2021-08-28 | Stop reason: HOSPADM

## 2021-08-25 RX ORDER — VITAMIN B COMPLEX
2000 TABLET ORAL DAILY
Status: DISCONTINUED | OUTPATIENT
Start: 2021-08-25 | End: 2021-08-25 | Stop reason: SDUPTHER

## 2021-08-25 RX ORDER — VITAMIN B COMPLEX
2000 TABLET ORAL DAILY
Status: DISCONTINUED | OUTPATIENT
Start: 2021-08-26 | End: 2021-08-28 | Stop reason: HOSPADM

## 2021-08-25 RX ORDER — DEXAMETHASONE SODIUM PHOSPHATE 10 MG/ML
6 INJECTION, SOLUTION INTRAMUSCULAR; INTRAVENOUS EVERY 24 HOURS
Status: DISCONTINUED | OUTPATIENT
Start: 2021-08-26 | End: 2021-08-28 | Stop reason: HOSPADM

## 2021-08-25 RX ORDER — 0.9 % SODIUM CHLORIDE 0.9 %
30 INTRAVENOUS SOLUTION INTRAVENOUS PRN
Status: DISCONTINUED | OUTPATIENT
Start: 2021-08-25 | End: 2021-08-28 | Stop reason: HOSPADM

## 2021-08-25 RX ORDER — ACETAMINOPHEN 325 MG/1
650 TABLET ORAL EVERY 6 HOURS PRN
Status: DISCONTINUED | OUTPATIENT
Start: 2021-08-25 | End: 2021-08-28 | Stop reason: HOSPADM

## 2021-08-25 RX ADMIN — ATORVASTATIN CALCIUM 10 MG: 20 TABLET, FILM COATED ORAL at 01:08

## 2021-08-25 RX ADMIN — ZINC SULFATE 220 MG (50 MG) CAPSULE 50 MG: CAPSULE at 10:29

## 2021-08-25 RX ADMIN — ENOXAPARIN SODIUM 40 MG: 40 INJECTION SUBCUTANEOUS at 01:08

## 2021-08-25 RX ADMIN — ATORVASTATIN CALCIUM 10 MG: 20 TABLET, FILM COATED ORAL at 20:41

## 2021-08-25 RX ADMIN — PYRIDOXINE HCL TAB 50 MG 50 MG: 50 TAB at 10:29

## 2021-08-25 RX ADMIN — AZITHROMYCIN DIHYDRATE 500 MG: 500 INJECTION, POWDER, LYOPHILIZED, FOR SOLUTION INTRAVENOUS at 23:13

## 2021-08-25 RX ADMIN — SODIUM CHLORIDE 1000 MG: 9 INJECTION INTRAMUSCULAR; INTRAVENOUS; SUBCUTANEOUS at 23:13

## 2021-08-25 RX ADMIN — Medication 10 ML: at 20:42

## 2021-08-25 RX ADMIN — DEXAMETHASONE SODIUM PHOSPHATE 6 MG: 10 INJECTION, SOLUTION INTRAMUSCULAR; INTRAVENOUS at 20:41

## 2021-08-25 RX ADMIN — OXYCODONE HYDROCHLORIDE AND ACETAMINOPHEN 500 MG: 500 TABLET ORAL at 20:41

## 2021-08-25 RX ADMIN — REMDESIVIR 200 MG: 100 INJECTION, POWDER, LYOPHILIZED, FOR SOLUTION INTRAVENOUS at 12:20

## 2021-08-25 RX ADMIN — ENOXAPARIN SODIUM 30 MG: 30 INJECTION SUBCUTANEOUS at 20:42

## 2021-08-25 RX ADMIN — SODIUM CHLORIDE: 9 INJECTION, SOLUTION INTRAVENOUS at 09:07

## 2021-08-25 RX ADMIN — Medication 2000 UNITS: at 10:29

## 2021-08-25 RX ADMIN — OXYCODONE HYDROCHLORIDE AND ACETAMINOPHEN 500 MG: 500 TABLET ORAL at 10:29

## 2021-08-25 ASSESSMENT — ENCOUNTER SYMPTOMS: TACHYPNEA: 1

## 2021-08-25 ASSESSMENT — PAIN SCALES - GENERAL: PAINLEVEL_OUTOF10: 0

## 2021-08-25 NOTE — ED NOTES
Registration notified that Dr Sebastian Williamson will assume care.      Joyce Linares RN  08/25/21 0575

## 2021-08-25 NOTE — CARE COORDINATION
Contacted by Dr. Dominga Ocasio from ER. Patient requested to change to Dr Earnest Ceron service effective immediately. I left orders in place from [facility] as well as my orders from earlier this morning. Dr. Debbie Cabrales is free to contact me with any questions but I did not evaluate patient face to face; I had received sign out from [facility] and reviewed chart and placed some orders earlier today.     Electronically signed by Dennis Lopes MD on 8/25/2021 at 1:43 PM

## 2021-08-25 NOTE — ED NOTES
Dr. Eloise Bennett notified of change in pt's condition. Ordered that respiratory therapy follow their protocol.      Theta File, RN  08/25/21 [id number]

## 2021-08-25 NOTE — ACP (ADVANCE CARE PLANNING)
Advance Care Planning     Advance Care Planning Activator (Inpatient)  Conversation Note      Date of ACP Conversation: 8/25/2021     Conversation Conducted with: Patient      ACP Activator: ANGELA Welch      Health Care Decision Maker:      Patient then wife. Care Preferences    Ventilation: \"If you were in your present state of health and suddenly became very ill and were unable to breathe on your own, what would your preference be about the use of a ventilator (breathing machine) if it were available to you? \"      Would the patient desire the use of ventilator (breathing machine)?: yes    \"If your health worsens and it becomes clear that your chance of recovery is unlikely, what would your preference be about the use of a ventilator (breathing machine) if it were available to you? \"     Would the patient desire the use of ventilator (breathing machine)?: Yes      Resuscitation  \"CPR works best to restart the heart when there is a sudden event, like a heart attack, in someone who is otherwise healthy. Unfortunately, CPR does not typically restart the heart for people who have serious health conditions or who are very sick. \"    \"In the event your heart stopped as a result of an underlying serious health condition, would you want attempts to be made to restart your heart (answer \"yes\" for attempt to resuscitate) or would you prefer a natural death (answer \"no\" for do not attempt to resuscitate)? \" yes       [] Yes   [] No   Educated Patient / Kristen Roldan regarding differences between Advance Directives and portable DNR orders.     Length of ACP Conversation in minutes:      Conversation Outcomes:  [x] ACP discussion completed  [] Existing advance directive reviewed with patient; no changes to patient's previously recorded wishes  [] New Advance Directive completed  [] Portable Do Not Rescitate prepared for Provider review and signature  [] POLST/POST/MOLST/MOST prepared for Provider review and signature      Follow-up plan:    [] Schedule follow-up conversation to continue planning  [] Referred individual to Provider for additional questions/concerns   [] Advised patient/agent/surrogate to review completed ACP document and update if needed with changes in condition, patient preferences or care setting    [x] This note routed to one or more involved healthcare providers    {

## 2021-08-25 NOTE — H&P
[facility] History and Physical      CHIEF COMPLAINT: Fever    History of Present Illness: Patient is a 62-year-old male with past medical history significant for GERD, hyperlipidemia. He presented to the emergency department after being diagnosed with COVID-19 2 days ago. Today, he is complaining of worsening cough, nausea, fever, body aches, fatigue. He denies significant shortness of breath. He just feels generally unwell and uncomfortable. His ED work-up was significant for CT of the chest showing no evidence of PE, but bilateral pneumonia. He was started on IV Rocephin and Zithromax. Medicine was asked admit for further treatment of acute hypoxic respiratory failure, COVID-19 pneumonia, possible superimposed bacterial pneumonia. REVIEW OF SYSTEMS:  A comprehensive review of systems was negative except for: what is in the HPI    PMH:  Past Medical History:   Diagnosis Date    GERD (gastroesophageal reflux disease)     History of cardiovascular stress test 12/2014    nuclear treadmill stress    Hyperlipidemia      Surgical History:  No past surgical history on file. Medications Prior to Admission:    Prior to Admission medications    Medication Sig Start Date End Date Taking? Authorizing Provider   dexamethasone (DECADRON) 6 MG tablet Take 1 tablet by mouth daily for 5 days 8/21/21 8/26/21  Jasbir Tejada PA-C   azithromycin (ZITHROMAX Z-MICKI) 250 MG tablet Take 2 tabs on day one, followed by 1 tablet daily for 4 days. 8/21/21 8/31/21  Bryan Tejada PA-C   atorvastatin (LIPITOR) 10 MG tablet Take 10 mg by mouth daily    Historical Provider, MD       Allergies:    Patient has no known allergies. Social History:    reports that he has never smoked. He uses smokeless tobacco. He reports that he does not drink alcohol and does not use drugs. Family History:   family history includes Coronary Art Dis in his father; Diabetes in his father; Other in his mother. Result   No evidence of pulmonary embolism. Bilateral pneumonia, worse on the left. ASSESSMENT:      Active Problems:    Pneumonia due to COVID-19 virus  Resolved Problems:    * No resolved hospital problems. *    PLAN:    COVID-19 pneumonia  Acute hypoxic respiratory failure  82-year-old male diagnosed with COVID-19 2 days ago, presenting to the emergency department with complaints of fever, chills, body aches, nausea. Denies significant shortness of breath, but is found to be hypoxic on room air and placed on supplemental oxygen in the ED. His work-up is otherwise significant for a CTA of the chest with no evidence of PE, but with evidence of bilateral pneumonia. There was concern for possible superimposed bacterial pneumonia, so he was started on Rocephin and Zithromax.  -Admit to medical unit  -Supplemental oxygen, wean as tolerated  -Start remdesivir  -Continue Decadron daily  -Continue empiric antibiotics for now  -Follow cultures    Hyperlipidemia  -Continue home dose of atorvastatin    Code Status: Full  DVT prophylaxis: Lovenox      NOTE: This report was transcribed using voice recognition software. Every effort was made to ensure accuracy; however, inadvertent computerized transcription errors may be present.   Electronically signed by Yue Reyes DO on 8/25/2021 at 12:16 AM

## 2021-08-25 NOTE — CARE COORDINATION
SS Note: Covid positive 8/21/21. Pt in ED and in isolation, SW called pt in room and completed ACP and transition of care. Pt stated he resides with his wife and children in a two story home, bedroom and bathroom both on second floor, two steps to enter home. Stated was independent prior to admission including employment and driving. Stated no past SNF, HHC, or DME. Stated his PCP is Dr. Sudha Farias. Patient has prescription coverage, uses [employer] [facility]. Pt plans to return home upon discharge, wife will transport home, denies any needs including HHC.   Electronically signed by ANGELA Edward on 8/25/2021 at 1:52 PM

## 2021-08-26 LAB
ALBUMIN SERPL-MCNC: 3.2 G/DL (ref 3.5–5.2)
ALP BLD-CCNC: 54 U/L (ref 40–129)
ALT SERPL-CCNC: 65 U/L (ref 0–40)
ANION GAP SERPL CALCULATED.3IONS-SCNC: 10 MMOL/L (ref 7–16)
APTT: 25.9 SEC (ref 24.5–35.1)
AST SERPL-CCNC: 41 U/L (ref 0–39)
BASOPHILS ABSOLUTE: 0 E9/L (ref 0–0.2)
BASOPHILS RELATIVE PERCENT: 0 % (ref 0–2)
BILIRUB SERPL-MCNC: 0.2 MG/DL (ref 0–1.2)
BUN BLDV-MCNC: 12 MG/DL (ref 6–20)
BURR CELLS: ABNORMAL
C-REACTIVE PROTEIN: 2.8 MG/DL (ref 0–0.4)
C-REACTIVE PROTEIN: 4.2 MG/DL (ref 0–0.4)
CALCIUM SERPL-MCNC: 8.4 MG/DL (ref 8.6–10.2)
CHLORIDE BLD-SCNC: 102 MMOL/L (ref 98–107)
CHOLESTEROL, TOTAL: 140 MG/DL (ref 0–199)
CO2: 26 MMOL/L (ref 22–29)
CREAT SERPL-MCNC: 0.7 MG/DL (ref 0.7–1.2)
D DIMER: 311 NG/ML DDU
EOSINOPHILS ABSOLUTE: 0 E9/L (ref 0.05–0.5)
EOSINOPHILS RELATIVE PERCENT: 0 % (ref 0–6)
FERRITIN: 1985 NG/ML
FIBRINOGEN: >700 MG/DL (ref 225–540)
GFR AFRICAN AMERICAN: >60
GFR NON-AFRICAN AMERICAN: >60 ML/MIN/1.73
GLUCOSE BLD-MCNC: 137 MG/DL (ref 74–99)
HBA1C MFR BLD: 5.6 % (ref 4–5.6)
HCT VFR BLD CALC: 38.4 % (ref 37–54)
HDLC SERPL-MCNC: 40 MG/DL
HEMOGLOBIN: 12.8 G/DL (ref 12.5–16.5)
INR BLD: 1
LACTATE DEHYDROGENASE: 439 U/L (ref 135–225)
LACTIC ACID: 1 MMOL/L (ref 0.5–2.2)
LDL CHOLESTEROL CALCULATED: 75 MG/DL (ref 0–99)
LYMPHOCYTES ABSOLUTE: 0.24 E9/L (ref 1.5–4)
LYMPHOCYTES RELATIVE PERCENT: 2.6 % (ref 20–42)
MCH RBC QN AUTO: 29 PG (ref 26–35)
MCHC RBC AUTO-ENTMCNC: 33.3 % (ref 32–34.5)
MCV RBC AUTO: 87.1 FL (ref 80–99.9)
MONOCYTES ABSOLUTE: 0.16 E9/L (ref 0.1–0.95)
MONOCYTES RELATIVE PERCENT: 1.7 % (ref 2–12)
NEUTROPHILS ABSOLUTE: 7.58 E9/L (ref 1.8–7.3)
NEUTROPHILS RELATIVE PERCENT: 95.7 % (ref 43–80)
PDW BLD-RTO: 12.2 FL (ref 11.5–15)
PLATELET # BLD: 318 E9/L (ref 130–450)
PMV BLD AUTO: 9.7 FL (ref 7–12)
POIKILOCYTES: ABNORMAL
POLYCHROMASIA: ABNORMAL
POTASSIUM REFLEX MAGNESIUM: 4 MMOL/L (ref 3.5–5)
PROCALCITONIN: 0.11 NG/ML (ref 0–0.08)
PROTHROMBIN TIME: 12 SEC (ref 9.3–12.4)
RBC # BLD: 4.41 E12/L (ref 3.8–5.8)
SODIUM BLD-SCNC: 138 MMOL/L (ref 132–146)
TOTAL PROTEIN: 6.4 G/DL (ref 6.4–8.3)
TRIGL SERPL-MCNC: 124 MG/DL (ref 0–149)
TROPONIN, HIGH SENSITIVITY: <6 NG/L (ref 0–11)
VITAMIN D 25-HYDROXY: 20 NG/ML (ref 30–100)
VLDLC SERPL CALC-MCNC: 25 MG/DL
WBC # BLD: 7.9 E9/L (ref 4.5–11.5)

## 2021-08-26 PROCEDURE — 2500000003 HC RX 250 WO HCPCS: Performed by: INTERNAL MEDICINE

## 2021-08-26 PROCEDURE — 36415 COLL VENOUS BLD VENIPUNCTURE: CPT

## 2021-08-26 PROCEDURE — 80061 LIPID PANEL: CPT

## 2021-08-26 PROCEDURE — 85610 PROTHROMBIN TIME: CPT

## 2021-08-26 PROCEDURE — 6370000000 HC RX 637 (ALT 250 FOR IP): Performed by: INTERNAL MEDICINE

## 2021-08-26 PROCEDURE — 6360000002 HC RX W HCPCS: Performed by: INTERNAL MEDICINE

## 2021-08-26 PROCEDURE — 85378 FIBRIN DEGRADE SEMIQUANT: CPT

## 2021-08-26 PROCEDURE — 94640 AIRWAY INHALATION TREATMENT: CPT

## 2021-08-26 PROCEDURE — 85384 FIBRINOGEN ACTIVITY: CPT

## 2021-08-26 PROCEDURE — 2580000003 HC RX 258: Performed by: INTERNAL MEDICINE

## 2021-08-26 PROCEDURE — 82306 VITAMIN D 25 HYDROXY: CPT

## 2021-08-26 PROCEDURE — 83605 ASSAY OF LACTIC ACID: CPT

## 2021-08-26 PROCEDURE — 84145 PROCALCITONIN (PCT): CPT

## 2021-08-26 PROCEDURE — 82728 ASSAY OF FERRITIN: CPT

## 2021-08-26 PROCEDURE — 85730 THROMBOPLASTIN TIME PARTIAL: CPT

## 2021-08-26 PROCEDURE — 83036 HEMOGLOBIN GLYCOSYLATED A1C: CPT

## 2021-08-26 PROCEDURE — 80053 COMPREHEN METABOLIC PANEL: CPT

## 2021-08-26 PROCEDURE — 2060000000 HC ICU INTERMEDIATE R&B

## 2021-08-26 PROCEDURE — 83615 LACTATE (LD) (LDH) ENZYME: CPT

## 2021-08-26 PROCEDURE — 86140 C-REACTIVE PROTEIN: CPT

## 2021-08-26 PROCEDURE — 85025 COMPLETE CBC W/AUTO DIFF WBC: CPT

## 2021-08-26 PROCEDURE — 84484 ASSAY OF TROPONIN QUANT: CPT

## 2021-08-26 RX ADMIN — DEXAMETHASONE SODIUM PHOSPHATE 6 MG: 10 INJECTION, SOLUTION INTRAMUSCULAR; INTRAVENOUS at 08:19

## 2021-08-26 RX ADMIN — SODIUM CHLORIDE 1000 MG: 9 INJECTION INTRAMUSCULAR; INTRAVENOUS; SUBCUTANEOUS at 23:09

## 2021-08-26 RX ADMIN — ATORVASTATIN CALCIUM 10 MG: 20 TABLET, FILM COATED ORAL at 20:54

## 2021-08-26 RX ADMIN — ENOXAPARIN SODIUM 30 MG: 30 INJECTION SUBCUTANEOUS at 20:54

## 2021-08-26 RX ADMIN — REMDESIVIR 100 MG: 100 INJECTION, POWDER, LYOPHILIZED, FOR SOLUTION INTRAVENOUS at 11:56

## 2021-08-26 RX ADMIN — PYRIDOXINE HCL TAB 50 MG 50 MG: 50 TAB at 08:19

## 2021-08-26 RX ADMIN — BUDESONIDE AND FORMOTEROL FUMARATE DIHYDRATE 2 PUFF: 160; 4.5 AEROSOL RESPIRATORY (INHALATION) at 06:51

## 2021-08-26 RX ADMIN — Medication 10 ML: at 08:22

## 2021-08-26 RX ADMIN — AZITHROMYCIN DIHYDRATE 500 MG: 500 INJECTION, POWDER, LYOPHILIZED, FOR SOLUTION INTRAVENOUS at 23:09

## 2021-08-26 RX ADMIN — OXYCODONE HYDROCHLORIDE AND ACETAMINOPHEN 500 MG: 500 TABLET ORAL at 08:17

## 2021-08-26 RX ADMIN — ZINC SULFATE 220 MG (50 MG) CAPSULE 50 MG: CAPSULE at 08:19

## 2021-08-26 RX ADMIN — Medication 10 ML: at 20:56

## 2021-08-26 RX ADMIN — ENOXAPARIN SODIUM 30 MG: 30 INJECTION SUBCUTANEOUS at 08:16

## 2021-08-26 RX ADMIN — ACETAMINOPHEN 650 MG: 325 TABLET ORAL at 21:17

## 2021-08-26 RX ADMIN — Medication 2000 UNITS: at 08:18

## 2021-08-26 RX ADMIN — OXYCODONE HYDROCHLORIDE AND ACETAMINOPHEN 500 MG: 500 TABLET ORAL at 20:55

## 2021-08-26 ASSESSMENT — ENCOUNTER SYMPTOMS
SHORTNESS OF BREATH: 1
COUGH: 1

## 2021-08-26 ASSESSMENT — PAIN SCALES - GENERAL
PAINLEVEL_OUTOF10: 4
PAINLEVEL_OUTOF10: 0

## 2021-08-26 NOTE — CONSULTS
[facility]  Consult Note    [address] CARE [facility], [address]  Phone [phone]   [phone]      Admit Date: 2021  4:28 PM  Pt Name: Malena Danielle  MRN: [id number]  : 1975  Reason for Consult:    Chief Complaint   Patient presents with    Positive For Covid-19     fever, tylenol/motrin not working, pulse ox at yesi 86% at rest, nausea     Requesting Physician:  Namrata Stuart DO  PCP: Richa Ramachandran MD  History Obtained From:  patient, chart   ID consulted for Hypoxia [R09.02]  Pneumonia due to infectious organism, unspecified laterality, unspecified part of lung [J18.9]  Pneumonia due to COVID-19 virus [U07.1, J12.82]  COVID-19 [U07.1]  on hospital day 1  CHIEF COMPLAINT       Chief Complaint   Patient presents with    Positive For Covid-19     fever, tylenol/motrin not working, pulse ox at yesi 86% at rest, nausea     HISTORYOF PRESENT ILLNESS   Malena Danielle is a 42 y.o. male who presents with significant past medical history of  has a past medical history of GERD (gastroesophageal reflux disease), History of cardiovascular stress test, and Hyperlipidemia.    ED TRIAGEVITALS  BP: 118/64, Temp: 98.4 °F (36.9 °C), Pulse: 80, Resp: 22, SpO2: 91 %  HPI PT WITH HYPERLIPIDEMIA   PT TESTED +COVID UNVACCINATED EXPOSURE FROM WIFE WHO WORKS AT A HIGH SCHOOL  HIS CHILDREN ARE NEG   HE HAS DRY COUGH WITH NAUSEA ND DRY HEAVES/FEVERS/CHILLS/MYALGIA  CT CHEST NEG PE  WBC5.1 CR0.8 ALT61 AST44    REVIEW OF SYSTEMS     CONSTITUTIONAL:   No fever, chills, weight loss  ALLERGIES:    No urticaria, hay fever,    EYES:     No blurry vision, loss of vision,eye pain  ENT:      No hearing loss, sore throat  CARDIOVASCULAR:  No chest pain or palpitations  RESPIRATORY:   No cough, sob  ENDOCRINE:    No increase thirst, urination   HEME-LYMPH:   No easy bruising or bleeding  GI:     No nausea, vomiting or diarrhea  :     No urinary complaints  NEURO:    No seizures, stroke, HA  MUSCULOSKELETAL:  No muscle aches or pain, no joint pain  SKIN:     No rash or itch  PSYCH:    No depression or anxiety    Medications Prior to Admission: dexamethasone (DECADRON) 6 MG tablet, Take 1 tablet by mouth daily for 5 days  azithromycin (ZITHROMAX Z-MICKI) 250 MG tablet, Take 2 tabs on day one, followed by 1 tablet daily for 4 days.   atorvastatin (LIPITOR) 10 MG tablet, Take 10 mg by mouth daily  CURRENT MEDICATIONS     Current Facility-Administered Medications:     cefTRIAXone (ROCEPHIN) 1,000 mg in sodium chloride (PF) 10 mL IV syringe, 1,000 mg, IntraVENous, Q24H, Yair Mancilla DO    azithromycin (ZITHROMAX) 500 mg in D5W 250ml Vial Mate, 500 mg, IntraVENous, Q24H, Yair Mancilla DO    [COMPLETED] remdesivir 200 mg in sodium chloride 0.9 % 250 mL IVPB, 200 mg, IntraVENous, Once, Stopped at 08/25/21 1258 **FOLLOWED BY** [START ON 8/26/2021] remdesivir 100 mg in sodium chloride 0.9 % 250 mL IVPB, 100 mg, IntraVENous, Q24H, Yair Mancilla DO    0.9 % sodium chloride bolus, 30 mL, IntraVENous, PRN, Steffi Garces DO    vitamin B-6 (PYRIDOXINE) tablet 50 mg, 50 mg, Oral, Daily, Teo Somers MD, 50 mg at 08/25/21 1029    ascorbic acid (VITAMIN C) tablet 500 mg, 500 mg, Oral, BID, Teo Somers MD, 500 mg at 08/25/21 2041    zinc sulfate (ZINCATE) capsule 50 mg, 50 mg, Oral, Daily, Teo Somers MD, 50 mg at 08/25/21 1029    acetaminophen (TYLENOL) tablet 650 mg, 650 mg, Oral, Q6H PRN **OR** acetaminophen (TYLENOL) suppository 650 mg, 650 mg, Rectal, Q6H PRN, Sylvain Michelle MD    guaiFENesin-dextromethorphan (ROBITUSSIN DM) 100-10 MG/5ML syrup 5 mL, 5 mL, Oral, Q4H PRN, MD [place] ON 8/26/2021] Vitamin D (CHOLECALCIFEROL) tablet 2,000 Units, 2,000 Units, Oral, Daily, MD [place] ON 8/26/2021] dexamethasone (PF) (DECADRON) injection 6 mg, 6 mg, IntraVENous, Q24H, Sylvain Michelle MD    enoxaparin (LOVENOX) injection 30 mg, 30 mg, Subcutaneous, BID, Sylvain Michelle MD, 30 mg at 08/25/21 2042    budesonide-formoterol (SYMBICORT) 160-4.5 MCG/ACT inhaler 2 puff, 2 puff, Inhalation, BID, Sylvain Michelle MD    atorvastatin (LIPITOR) tablet 10 mg, 10 mg, Oral, Nightly, Steffi Sparkser, , 10 mg at 08/25/21 2041    sodium chloride flush 0.9 % injection 5-40 mL, 5-40 mL, IntraVENous, 2 times per day, Steffi Sparkser, DO, 10 mL at 08/25/21 2042    sodium chloride flush 0.9 % injection 5-40 mL, 5-40 mL, IntraVENous, PRN, Steffi Sparkser, DO    0.9 % sodium chloride infusion, 25 mL, IntraVENous, PRN, Steffi Venturazier, DO    ondansetron (ZOFRAN-ODT) disintegrating tablet 4 mg, 4 mg, Oral, Q8H PRN **OR** ondansetron (ZOFRAN) injection 4 mg, 4 mg, IntraVENous, Q6H PRN, Steffi Venturazier, DO    polyethylene glycol (GLYCOLAX) packet 17 g, 17 g, Oral, Daily PRN, Steffi Garces, DO  ALLERGIES     Patient has no known allergies. There is no immunization history on file for this patient. PAST MEDICAL HISTORY     Past Medical History:   Diagnosis Date    GERD (gastroesophageal reflux disease)     History of cardiovascular stress test 12/2014    nuclear treadmill stress    Hyperlipidemia      SURGICAL HISTORY     No past surgical history on file.   FAMILY HISTORY       Family History   Problem Relation Age of Onset    Other Mother         RA    Diabetes Father     Coronary Art Dis Father      SOCIAL HISTORY       Social History     Socioeconomic History    Marital status:      Spouse name: Not on file    Number of children: Not on file    Years of education: Not on file    Highest education level: Not on file   Occupational History    Not on file   Tobacco Use    Smoking status: Never Smoker    Smokeless tobacco: Current User   Substance and Sexual Activity    Alcohol use: No    Drug use: No    Sexual activity: Yes     Partners: Female   Other Topics Concern    Not on file   Social History Narrative    Not on file     Social Determinants of Health     Financial Resource Strain:     Difficulty of Paying Living Expenses:    Food Insecurity:     Worried About Running Out of Food in the Last Year:     [address] in the Last Year:    Transportation Needs:     Lack of Transportation (Medical):  Lack of Transportation (Non-Medical):    Physical Activity:     Days of Exercise per Week:     Minutes of Exercise per Session:    Stress:     Feeling of Stress :    Social Connections:     Frequency of Communication with Friends and Family:     Frequency of Social Gatherings with Friends and Family:     Attends [employer] Services:     Active Member of Clubs or Organizations:     Attends Club or Organization Meetings:     Marital Status:    Intimate Partner Violence:     Fear of Current or Ex-Partner:     Emotionally Abused:     Physically Abused:     Sexually Abused:      · [employer] [address]       ED Triage Vitals   BP Temp Temp Source Pulse Resp SpO2 Height Weight   08/24/21 1607 08/24/21 1604 08/24/21 1604 08/24/21 1604 08/24/21 1607 08/24/21 1604 08/24/21 1608 08/24/21 1608   (!) 146/78 99.1 °F (37.3 °C) Infrared 108 18 92 % 5' 8\" (1.727 m) 180 lb (81.6 kg)     Vitals:    Vitals:    08/25/21 1255 08/25/21 1620 08/25/21 1851 08/25/21 1930   BP: 108/73 114/74 113/74 118/64   Pulse: 85 75 76 80   Resp: 24 28 (!) 34 22   Temp: 98.7 °F (37.1 °C) 98.6 °F (37 °C)  98.4 °F (36.9 °C)   TempSrc: Oral Oral  Oral   SpO2: 95% 93% 93% 91%   Weight:       Height:         Physical Exam   Constitutional/General: Alert and oriented, NAD  Head: NC/AT  Eyes: PERRL, EOMI  Mouth: Normal mucosa, no thrush   Neck: Supple, full ROM,    Pulmonary: Lungs DECto auscultation bilaterally. Not in respiratory distress  Cardiovascular:  Regular rate and rhythm, no murmurs, gallops, or rubs. Abdomen: Soft, + BS.    distension. Nontender. Extremities: Moves all extremities x 4.    Warm and well perfused  Pulses:  Distal pulses intact  Skin: Warm and dry without rash  Neurologic:    No focal deficits  Psych: Normal Affect     DIAGNOSTIC RESULTS   RADIOLOGY:   CTA CHEST W CONTRAST    Result Date: 8/24/2021  EXAMINATION: CTA OF THE CHEST 8/24/2021 7:33 pm TECHNIQUE: CTA of the chest was performed after the administration of intravenous contrast.  Multiplanar reformatted images are provided for review. MIP images are provided for review. Dose modulation, iterative reconstruction, and/or weight based adjustment of the mA/kV was utilized to reduce the radiation dose to as low as reasonably achievable. COMPARISON: None. HISTORY: ORDERING SYSTEM PROVIDED HISTORY: covid, hypoxic, rule out PE TECHNOLOGIST PROVIDED HISTORY: Reason for exam:->covid, hypoxic, rule out PE Decision Support Exception - unselect if not a suspected or confirmed emergency medical condition->Emergency Medical Condition (MA) FINDINGS: Pulmonary Arteries: Pulmonary arteries are adequately opacified for evaluation. No evidence of intraluminal filling defect to suggest pulmonary embolism. Main pulmonary artery is normal in caliber. Mediastinum: No evidence of mediastinal lymphadenopathy. The heart and pericardium demonstrate no acute abnormality. There is no acute abnormality of the thoracic aorta. Lungs/pleura: The lungs demonstrate bilateral infiltrates, worse on the left. No evidence of pleural effusion or pneumothorax. Upper Abdomen: Limited images of the upper abdomen are unremarkable. Soft Tissues/Bones: No acute bone or soft tissue abnormality. No evidence of pulmonary embolism. Bilateral pneumonia, worse on the left.      LABS  Recent Labs     08/24/21  1724 08/25/21  0544   WBC 5.1 3.0*   HGB 13.7 11.9*   HCT 39.7 35.1*   MCV 85.2 85.8    253     Recent Labs     08/24/21  1724 08/24/21  1724 08/25/21  0544     --  135   K 3.7  --  3.8   CL 97*   < > 102   CO2 26   < > 21*   BUN 8  --  9   CREATININE 0.8  --  0.7   GFRAA >60  --  >60   LABGLOM >60  --  >60   GLUCOSE 111*  --  152*   PROT 6.5  --   --    LABALBU 3.6  --   --    CALCIUM 8.3*  --  8.1*   BILITOT 0.4  --   --    ALKPHOS 64  --   --    AST 44*  --   --    ALT 61*  --   --     < > = values in this interval not displayed. Recent Labs     08/25/21  1550   PROCAL 0.14*        Lab Results   Component Value Date    COVID19 DETECTED 08/21/2021     COVID-19/KRIS-COV2 LABS  Recent Labs     08/24/21  1724 08/25/21  1550   PROCAL  --  0.14*   INR 1.1  --    PROTIME 12.9*  --    AST 44*  --    ALT 61*  --      Lab Results   Component Value Date    CHOL 220 12/02/2015    TRIG 191 12/02/2015    HDL 60 05/11/2017    LDLCALC 181 05/11/2017    LABVLDL 19 05/11/2017        MICROBIOLOGY:     Cultures :   Lab Results   Component Value Date    BC 24 Hours no growth 08/24/2021     Lab Results   Component Value Date    BLOODCULT2 24 Hours no growth 08/24/2021        Patient is a 42 y.o. male who presented with   Chief Complaint   Patient presents with    Positive For Covid-19     fever, tylenol/motrin not working, pulse ox at yesi 86% at rest, nausea        FINAL IMPRESSION    FEVERS  LEUKOPENIA   TRANSAMINITIS  1. COVID-19    2. Pneumonia due to infectious organism, unspecified laterality, unspecified part of lung    3.  Hypoxia           cefTRIAXone (ROCEPHIN) 1,000 mg in sodium chloride (PF) 10 mL IV syringe, Q24H  azithromycin (ZITHROMAX) 500 mg in D5W 250ml Vial Mate, Q24H  [START ON 8/26/2021] remdesivir 100 mg in sodium chloride 0.9 % 250 mL IVPB, Q24H  0.9 % sodium chloride bolus, PRN  vitamin B-6 (PYRIDOXINE) tablet 50 mg, Daily  ascorbic acid (VITAMIN C) tablet 500 mg, BID  zinc sulfate (ZINCATE) capsule 50 mg, Daily     [START ON 8/26/2021] Vitamin D (CHOLECALCIFEROL) tablet 2,000 Units, Daily  [START ON 8/26/2021] dexamethasone (PF) (DECADRON) injection 6 mg, Q24H  enoxaparin (LOVENOX) injection 30 mg, BID       DISCUSSED PRONING/IS  CHECK HEMOGLOBIN A1C/LIPID PANEL     Imaging and labs were reviewed per medical records and any ID pertinent labs were addressed with the patient. The patient/FAMILY  was educated about the diagnosis, prognosis, indications, risks and benefits of treatment. An opportunity to ask questions was given to the patient/FAMILY and questions were answered. Thank you for involving me in the care of Houghton Pro. Please do not hesitate to call for any questions or concerns.          Electronically signed by Loc Diallo MD on 8/25/2021 at 8:50 PM

## 2021-08-26 NOTE — PROGRESS NOTES
[facility]  [facility]  Progress Note    NAME: Annie Sanchez  MR:  [id number]  :   1975  DATE OF SERVICE:21    This is a face to face encounter with Annie Sanchez 42 y.o. male on 21    CHIEF COMPLAINT     ID following for   Chief Complaint   Patient presents with    Positive For Covid-19     fever, tylenol/motrin not working, pulse ox at yesi 86% at rest, nausea     HISTORY OF PRESENT ILLNESS   Pt seen and examined  21  Pt in bed on o2 feels less sob with exertion    Patient is tolerating medications. No reported adverse drug reactions. REVIEW OF SYSTEMS     As stated above in the chief complaint, otherwise negative. CURRENT MEDICATIONS      cefTRIAXone (ROCEPHIN) IV  1,000 mg IntraVENous Q24H    azithromycin  500 mg IntraVENous Q24H    remdesivir IVPB  100 mg IntraVENous Q24H    vitamin B-6  50 mg Oral Daily    ascorbic acid  500 mg Oral BID    zinc sulfate  50 mg Oral Daily    Vitamin D  2,000 Units Oral Daily    dexamethasone  6 mg IntraVENous Q24H    enoxaparin  30 mg Subcutaneous BID    budesonide-formoterol  2 puff Inhalation BID    atorvastatin  10 mg Oral Nightly    sodium chloride flush  5-40 mL IntraVENous 2 times per day     Continuous Infusions:   sodium chloride       PRN Meds:sodium chloride, acetaminophen **OR** acetaminophen, guaiFENesin-dextromethorphan, sodium chloride flush, sodium chloride, ondansetron **OR** ondansetron, polyethylene glycol    PHYSICAL EXAM     /63   Pulse 63   Temp 98.1 °F (36.7 °C)   Resp 16   Ht 5' 8\" (1.727 m)   Wt 180 lb (81.6 kg)   SpO2 92%   BMI 27.37 kg/m²   Temp  Av.3 °F (36.8 °C)  Min: 98.1 °F (36.7 °C)  Max: 98.6 °F (37 °C)  Constitutional:  The patient is awake, alert, and oriented. Skin:    Warm and dry. No rashes were noted. HEENT:   Round and reactive pupils. AT/NC  Neck:    Supple to movements. Chest:   No use of accessory muscles to breathe.  Symmetrical expansion. Cardiovascular:  S1 and S2 are rhythmic and regular. No murmurs appreciated. Abdomen:   Positive bowel sounds to auscultation. Benign to palpation. Extremities:   No clubbing, no cyanosis, no edema. CNS    AAxO   Lines: piv      DIAGNOSTIC RESULTS   Radiology:    Recent Labs     08/24/21  1724 08/25/21  0544 08/26/21  0628   WBC 5.1 3.0* 7.9   RBC 4.66 4.09 4.41   HGB 13.7 11.9* 12.8   HCT 39.7 35.1* 38.4   MCV 85.2 85.8 87.1   MCH 29.4 29.1 29.0   MCHC 34.5 33.9 33.3   RDW 12.1 12.1 12.2    253 318   MPV 9.7 9.7 9.7     Recent Labs     08/24/21  1724 08/25/21  0544 08/26/21  0628    135 138   K 3.7 3.8 4.0   CL 97* 102 102   CO2 26 21* 26   BUN 8 9 12   CREATININE 0.8 0.7 0.7   GLUCOSE 111* 152* 137*   PROT 6.5  --  6.4   LABALBU 3.6  --  3.2*   CALCIUM 8.3* 8.1* 8.4*   BILITOT 0.4  --  0.2   ALKPHOS 64  --  54   AST 44*  --  41*   ALT 61*  --  65*     Lab Results   Component Value Date    CRP 2.8 (H) 08/26/2021    CRP 4.2 (H) 08/25/2021     No results found for: SEDRATE  Recent Labs     08/24/21  1724 08/25/21  1550 08/25/21  1956 08/26/21  0628   CRP  --   --  4.2* 2.8*   PROCAL  --  0.14*  --  0.11*   FERRITIN  --   --   --  1,985   LDH  --   --   --  439*   DDIMER  --   --   --  311   FIBRINOGEN  --   --   --  >700*   INR 1.1  --   --  1.0   PROTIME 12.9*  --   --  12.0   AST 44*  --   --  41*   ALT 61*  --   --  65*   TRIG  --   --   --  124     Lab Results   Component Value Date    CHOL 140 08/26/2021    TRIG 124 08/26/2021    HDL 40 08/26/2021    LDLCALC 75 08/26/2021    LABVLDL 25 08/26/2021     Lab Results   Component Value Date/Time    VITD25 20 (L) 08/26/2021 06:28 AM       Microbiology:   No results for input(s): COVID19 in the last 72 hours.   Lab Results   Component Value Date    BC 24 Hours no growth 08/24/2021    BLOODCULT2 24 Hours no growth 08/24/2021        FINAL IMPRESSION    Pt had   Chief Complaint   Patient presents with    Positive For Covid-19     fever, tylenol/motrin not working, pulse ox at yesi 86% at rest, nausea    Admitted for Hypoxia [R09.02]  Pneumonia due to infectious organism, unspecified laterality, unspecified part of lung [J18.9]  Pneumonia due to COVID-19 virus [U07.1, J12.82]  COVID-19 [U07.1]     On treatment for   COVID PNEUMONIA   LEUKOPENIA RESOLVED    CONT RX    cefTRIAXone (ROCEPHIN) 1,000 mg in sodium chloride (PF) 10 mL IV syringe, Q24H  azithromycin (ZITHROMAX) 500 mg in D5W 250ml Vial Mate, Q24H  remdesivir 100 mg in sodium chloride 0.9 % 250 mL IVPB, Q24H  vitamin B-6 (PYRIDOXINE) tablet 50 mg, Daily  ascorbic acid (VITAMIN C) tablet 500 mg, BID  zinc sulfate (ZINCATE) capsule 50 mg, Daily  Vitamin D (CHOLECALCIFEROL) tablet 2,000 Units, Daily  dexamethasone (PF) (DECADRON) injection 6 mg, Q24H  enoxaparin (LOVENOX) injection 30 mg, BID  budesonide-formoterol (SYMBICORT) 160-4.5 MCG/ACT inhaler 2 puff, BID           · Monitor labs    Imaging and labs were reviewed per medical records. The patient was educated about the diagnosis, prognosis, indications, risks and benefits of treatment. An opportunity to ask questions was given to the patient/FAMILY. Thank you for involving me in the care of Aixa Richard I will continue to follow. Please do not hesitate to call for any questions or concerns.     Electronically signed by Ulysses Sang, MD on 8/26/2021 at 3:19 PM

## 2021-08-26 NOTE — PROGRESS NOTES
Pharmacy Documentation     Medication: Tocilizumab     Date: 8/25/2021  Physician: Dr. Best Garcia consulted to initiate Tocilizumab. Patient does not currently meet [facility] P&T approved Tocilizumab Criteria for Use to initiate medication (CRP not >/= 7.5). Pharmacy will sign off. Please re-consult if the clinical condition changes and patient meets criteria for initiation based on [facility] P[facility]T approved Tocilizumab Criteria for Use.

## 2021-08-27 LAB
ALBUMIN SERPL-MCNC: 3 G/DL (ref 3.5–5.2)
ALP BLD-CCNC: 56 U/L (ref 40–129)
ALT SERPL-CCNC: 46 U/L (ref 0–40)
ANION GAP SERPL CALCULATED.3IONS-SCNC: 11 MMOL/L (ref 7–16)
AST SERPL-CCNC: 21 U/L (ref 0–39)
BILIRUB SERPL-MCNC: 0.3 MG/DL (ref 0–1.2)
BUN BLDV-MCNC: 13 MG/DL (ref 6–20)
C-REACTIVE PROTEIN: 1.3 MG/DL (ref 0–0.4)
CALCIUM SERPL-MCNC: 8.2 MG/DL (ref 8.6–10.2)
CHLORIDE BLD-SCNC: 103 MMOL/L (ref 98–107)
CO2: 25 MMOL/L (ref 22–29)
CREAT SERPL-MCNC: 0.7 MG/DL (ref 0.7–1.2)
D DIMER: 334 NG/ML DDU
GFR AFRICAN AMERICAN: >60
GFR NON-AFRICAN AMERICAN: >60 ML/MIN/1.73
GLUCOSE BLD-MCNC: 136 MG/DL (ref 74–99)
POTASSIUM SERPL-SCNC: 3.2 MMOL/L (ref 3.5–5)
PROCALCITONIN: 0.05 NG/ML (ref 0–0.08)
SODIUM BLD-SCNC: 139 MMOL/L (ref 132–146)
TOTAL PROTEIN: 5.6 G/DL (ref 6.4–8.3)

## 2021-08-27 PROCEDURE — 2500000003 HC RX 250 WO HCPCS: Performed by: INTERNAL MEDICINE

## 2021-08-27 PROCEDURE — 2060000000 HC ICU INTERMEDIATE R&B

## 2021-08-27 PROCEDURE — 2580000003 HC RX 258: Performed by: INTERNAL MEDICINE

## 2021-08-27 PROCEDURE — 36415 COLL VENOUS BLD VENIPUNCTURE: CPT

## 2021-08-27 PROCEDURE — 86140 C-REACTIVE PROTEIN: CPT

## 2021-08-27 PROCEDURE — 6370000000 HC RX 637 (ALT 250 FOR IP): Performed by: INTERNAL MEDICINE

## 2021-08-27 PROCEDURE — 80053 COMPREHEN METABOLIC PANEL: CPT

## 2021-08-27 PROCEDURE — 94640 AIRWAY INHALATION TREATMENT: CPT

## 2021-08-27 PROCEDURE — 85378 FIBRIN DEGRADE SEMIQUANT: CPT

## 2021-08-27 PROCEDURE — 2700000000 HC OXYGEN THERAPY PER DAY

## 2021-08-27 PROCEDURE — 6360000002 HC RX W HCPCS: Performed by: INTERNAL MEDICINE

## 2021-08-27 PROCEDURE — 84145 PROCALCITONIN (PCT): CPT

## 2021-08-27 RX ADMIN — BUDESONIDE AND FORMOTEROL FUMARATE DIHYDRATE 2 PUFF: 160; 4.5 AEROSOL RESPIRATORY (INHALATION) at 19:21

## 2021-08-27 RX ADMIN — Medication 10 ML: at 20:33

## 2021-08-27 RX ADMIN — REMDESIVIR 100 MG: 100 INJECTION, POWDER, LYOPHILIZED, FOR SOLUTION INTRAVENOUS at 11:57

## 2021-08-27 RX ADMIN — BUDESONIDE AND FORMOTEROL FUMARATE DIHYDRATE 2 PUFF: 160; 4.5 AEROSOL RESPIRATORY (INHALATION) at 07:26

## 2021-08-27 RX ADMIN — ZINC SULFATE 220 MG (50 MG) CAPSULE 50 MG: CAPSULE at 09:25

## 2021-08-27 RX ADMIN — OXYCODONE HYDROCHLORIDE AND ACETAMINOPHEN 500 MG: 500 TABLET ORAL at 20:31

## 2021-08-27 RX ADMIN — Medication 10 ML: at 09:25

## 2021-08-27 RX ADMIN — AZITHROMYCIN DIHYDRATE 500 MG: 500 INJECTION, POWDER, LYOPHILIZED, FOR SOLUTION INTRAVENOUS at 23:11

## 2021-08-27 RX ADMIN — ENOXAPARIN SODIUM 30 MG: 30 INJECTION SUBCUTANEOUS at 20:31

## 2021-08-27 RX ADMIN — Medication 2000 UNITS: at 09:24

## 2021-08-27 RX ADMIN — DEXAMETHASONE SODIUM PHOSPHATE 6 MG: 10 INJECTION, SOLUTION INTRAMUSCULAR; INTRAVENOUS at 09:24

## 2021-08-27 RX ADMIN — ENOXAPARIN SODIUM 30 MG: 30 INJECTION SUBCUTANEOUS at 09:24

## 2021-08-27 RX ADMIN — PYRIDOXINE HCL TAB 50 MG 50 MG: 50 TAB at 09:25

## 2021-08-27 RX ADMIN — ATORVASTATIN CALCIUM 10 MG: 20 TABLET, FILM COATED ORAL at 20:29

## 2021-08-27 RX ADMIN — OXYCODONE HYDROCHLORIDE AND ACETAMINOPHEN 500 MG: 500 TABLET ORAL at 09:25

## 2021-08-27 ASSESSMENT — ENCOUNTER SYMPTOMS
SHORTNESS OF BREATH: 1
COUGH: 0
DIARRHEA: 0

## 2021-08-27 ASSESSMENT — PAIN SCALES - GENERAL: PAINLEVEL_OUTOF10: 0

## 2021-08-27 NOTE — PROGRESS NOTES
Progress Note  Date:2021       Room:0622/0622-02  Patient Name:Abdullahi Mace     YOB: 1975     Age:46 y.o. Subjective    Subjective:  Symptoms:  Stable. He reports shortness of breath, malaise, cough, weakness and anxiety. No chest pain, headache, chest pressure or anorexia. Diet:  Adequate intake. Activity level: Normal.    Pain:  He reports no pain. pt is feeling better  Review of Systems   Respiratory: Positive for cough and shortness of breath. Cardiovascular: Negative for chest pain. Gastrointestinal: Negative for anorexia. Neurological: Positive for weakness. Objective         Vitals Last 24 Hours:  TEMPERATURE:  Temp  Av.1 °F (36.7 °C)  Min: 97.9 °F (36.6 °C)  Max: 98.2 °F (36.8 °C)  RESPIRATIONS RANGE: Resp  Av.8  Min: 16  Max: 18  PULSE OXIMETRY RANGE: SpO2  Av.4 %  Min: 91 %  Max: 94 %  PULSE RANGE: Pulse  Av.8  Min: 63  Max: 93  BLOOD PRESSURE RANGE: Systolic (12ACX), IPS:037 , Min:109 , PXX:187   ; Diastolic (03WDZ), ROWDY:00, Min:63, Max:80    I/O (24Hr): Intake/Output Summary (Last 24 hours) at 2021  Last data filed at 2021 1812  Gross per 24 hour   Intake 760 ml   Output 0 ml   Net 760 ml     Objective:  General Appearance:  Comfortable. Vital signs: (most recent): Blood pressure 119/75, pulse 93, temperature 98.2 °F (36.8 °C), temperature source Infrared, resp. rate 16, height 5' 8\" (1.727 m), weight 180 lb (81.6 kg), SpO2 91 %. Vital signs are normal.    Output: Producing urine. HEENT: Normal HEENT exam.    Lungs:  Normal respiratory rate. Heart: Regular rhythm. S1 normal and S2 normal.    Abdomen: Abdomen is soft. Bowel sounds are normal.   There is no abdominal tenderness. Extremities: Normal range of motion. Pulses: Distal pulses are intact. Neurological: Patient is alert and oriented to person, place and time. Normal strength. Pupils:  Pupils are equal, round, and reactive to light. Labs/Imaging/Diagnostics    Labs:  CBC:  Recent Labs     08/24/21  1724 08/25/21  0544 08/26/21  0628   WBC 5.1 3.0* 7.9   RBC 4.66 4.09 4.41   HGB 13.7 11.9* 12.8   HCT 39.7 35.1* 38.4   MCV 85.2 85.8 87.1   RDW 12.1 12.1 12.2    253 318     CHEMISTRIES:  Recent Labs     08/24/21  1724 08/25/21  0544 08/26/21  0628    135 138   K 3.7 3.8 4.0   CL 97* 102 102   CO2 26 21* 26   BUN 8 9 12   CREATININE 0.8 0.7 0.7   GLUCOSE 111* 152* 137*   MG 2.1  --   --      PT/INR:  Recent Labs     08/24/21  1724 08/26/21  0628   PROTIME 12.9* 12.0   INR 1.1 1.0     APTT:  Recent Labs     08/24/21  1724 08/26/21  0628   APTT 29.6 25.9     LIVER PROFILE:  Recent Labs     08/24/21  1724 08/26/21  0628   AST 44* 41*   ALT 61* 65*   BILITOT 0.4 0.2   ALKPHOS 64 54       Imaging Last 24 Hours:  No results found. Assessment//Plan           Hospital Problems         Last Modified POA    * (Principal) [facility] 8/25/2021 Yes    Chest pain 8/25/2021 Yes    Pneumonia due to COVID-19 virus 8/25/2021 Yes    Hyperlipidemia 8/25/2021 Yes        Assessment:  (Problem List as of 8/26/2021 Reviewed: 8/25/2021  9:42 AM by Ivelisse Hammonds MD    Chest pain    Pneumonia due to COVID-19 virus    Hyperlipidemia    * (Principal) [facility]    ). Plan:   (Cont with tx).        Electronically signed by Harini Thayer MD on 8/26/21 at 10:18 PM EDT

## 2021-08-27 NOTE — CARE COORDINATION
SS NOTE: COVID POSITIVE 8/21. Pt's POX qualifies for home O2. SW spoke with pt today regarding home O2 providers. He has chosen [facility] and SW began a referral to [facility] today and they are following for orders and dch. Pt plans on returning home after dch. Denies the needs for any other services. ANGELA Molina.8/27/2021.2:21 PM.

## 2021-08-27 NOTE — PROGRESS NOTES
Pulse ox was 93% on 2Lnc at rest.  Pulse ox decreased to 88% on room air at rest.   Ambulated patient on room air. Oxygen saturation was 81% on room air while ambulating. 2lnc oxygen applied. Recovery pulse ox was 88% on 2L of oxygen while ambulating.

## 2021-08-27 NOTE — PROGRESS NOTES
[facility]  [facility]  Progress Note    NAME: Aixa Richard  MR:  [id number]  :   1975  DATE OF SERVICE:21    This is a face to face encounter with Aixa Richard 42 y.o. male on 21    CHIEF COMPLAINT     ID following for   Chief Complaint   Patient presents with    Positive For Covid-19     fever, tylenol/motrin not working, pulse ox at yesi 86% at rest, nausea     HISTORY OF PRESENT ILLNESS   Pt seen and examined  21  Pt in bed on o2 2L feels better  90%  He is considering d/c  Patient is tolerating medications. No reported adverse drug reactions. REVIEW OF SYSTEMS     As stated above in the chief complaint, otherwise negative. CURRENT MEDICATIONS      cefTRIAXone (ROCEPHIN) IV  1,000 mg IntraVENous Q24H    azithromycin  500 mg IntraVENous Q24H    remdesivir IVPB  100 mg IntraVENous Q24H    vitamin B-6  50 mg Oral Daily    ascorbic acid  500 mg Oral BID    zinc sulfate  50 mg Oral Daily    Vitamin D  2,000 Units Oral Daily    dexamethasone  6 mg IntraVENous Q24H    enoxaparin  30 mg Subcutaneous BID    budesonide-formoterol  2 puff Inhalation BID    atorvastatin  10 mg Oral Nightly    sodium chloride flush  5-40 mL IntraVENous 2 times per day     Continuous Infusions:   sodium chloride       PRN Meds:sodium chloride, acetaminophen **OR** acetaminophen, guaiFENesin-dextromethorphan, sodium chloride flush, sodium chloride, ondansetron **OR** ondansetron, polyethylene glycol    PHYSICAL EXAM     /70   Pulse 72   Temp 98 °F (36.7 °C) (Skin)   Resp 16   Ht 5' 8\" (1.727 m)   Wt 180 lb (81.6 kg)   SpO2 93%   BMI 27.37 kg/m²   Temp  Av.8 °F (36.6 °C)  Min: 97.2 °F (36.2 °C)  Max: 98.2 °F (36.8 °C)  Constitutional:  The patient is awake, alert, and oriented. Skin:    Warm and dry. No rashes were noted. HEENT:    AT/NC     Chest:   No use of accessory muscles to breathe. Symmetrical expansion.   Wheeze right post  Cardiovascular:  S1 and S2 are rhythmic and regular. No murmurs appreciated. Abdomen:   Positive bowel sounds to auscultation. Benign to palpation. Extremities:   No clubbing, no cyanosis, no edema. CNS    AAxO   Lines: piv      DIAGNOSTIC RESULTS   Radiology:    Recent Labs     08/24/21 1724 08/25/21 0544 08/26/21  0628   WBC 5.1 3.0* 7.9   RBC 4.66 4.09 4.41   HGB 13.7 11.9* 12.8   HCT 39.7 35.1* 38.4   MCV 85.2 85.8 87.1   MCH 29.4 29.1 29.0   MCHC 34.5 33.9 33.3   RDW 12.1 12.1 12.2    253 318   MPV 9.7 9.7 9.7     Recent Labs     08/24/21 1724 08/24/21 1724 08/25/21  0544 08/26/21  0628 08/27/21  0829      < > 135 138 139   K 3.7  --  3.8 4.0 3.2*   CL 97*   < > 102 102 103   CO2 26   < > 21* 26 25   BUN 8   < > 9 12 13   CREATININE 0.8   < > 0.7 0.7 0.7   GLUCOSE 111*   < > 152* 137* 136*   PROT 6.5  --   --  6.4 5.6*   LABALBU 3.6  --   --  3.2* 3.0*   CALCIUM 8.3*   < > 8.1* 8.4* 8.2*   BILITOT 0.4  --   --  0.2 0.3   ALKPHOS 64  --   --  54 56   AST 44*  --   --  41* 21   ALT 61*  --   --  65* 46*    < > = values in this interval not displayed.      Lab Results   Component Value Date    CRP 1.3 (H) 08/27/2021    CRP 2.8 (H) 08/26/2021    CRP 4.2 (H) 08/25/2021     No results found for: [address] [facility]     08/24/21 1724 08/25/21  1550 08/25/21  1956 08/26/21  0628 08/27/21  0829   CRP  --   --  4.2* 2.8* 1.3*   PROCAL  --  0.14*  --  0.11*  --    FERRITIN  --   --   --  1,985  --    LDH  --   --   --  439*  --    DDIMER  --   --   --  311 334   FIBRINOGEN  --   --   --  >700*  --    INR 1.1  --   --  1.0  --    PROTIME 12.9*  --   --  12.0  --    AST 44*  --   --  41* 21   ALT 61*  --   --  65* 46*   TRIG  --   --   --  124  --      Lab Results   Component Value Date    CHOL 140 08/26/2021    TRIG 124 08/26/2021    HDL 40 08/26/2021    LDLCALC 75 08/26/2021    LABVLDL 25 08/26/2021     Lab Results   Component Value Date/Time    VITD25 20 (L) 08/26/2021 06:28 AM

## 2021-08-28 VITALS
RESPIRATION RATE: 16 BRPM | OXYGEN SATURATION: 89 % | WEIGHT: 180 LBS | HEIGHT: 68 IN | BODY MASS INDEX: 27.28 KG/M2 | HEART RATE: 114 BPM | DIASTOLIC BLOOD PRESSURE: 74 MMHG | SYSTOLIC BLOOD PRESSURE: 120 MMHG | TEMPERATURE: 98.2 F

## 2021-08-28 LAB
ALBUMIN SERPL-MCNC: 3 G/DL (ref 3.5–5.2)
ALP BLD-CCNC: 56 U/L (ref 40–129)
ALT SERPL-CCNC: 84 U/L (ref 0–40)
ANION GAP SERPL CALCULATED.3IONS-SCNC: 10 MMOL/L (ref 7–16)
AST SERPL-CCNC: 58 U/L (ref 0–39)
BILIRUB SERPL-MCNC: 0.4 MG/DL (ref 0–1.2)
BUN BLDV-MCNC: 14 MG/DL (ref 6–20)
C-REACTIVE PROTEIN: 0.7 MG/DL (ref 0–0.4)
CALCIUM IONIZED: 1.22 MMOL/L (ref 1.15–1.33)
CALCIUM SERPL-MCNC: 8.2 MG/DL (ref 8.6–10.2)
CHLORIDE BLD-SCNC: 103 MMOL/L (ref 98–107)
CO2: 26 MMOL/L (ref 22–29)
CREAT SERPL-MCNC: 0.7 MG/DL (ref 0.7–1.2)
D DIMER: 309 NG/ML DDU
GFR AFRICAN AMERICAN: >60
GFR NON-AFRICAN AMERICAN: >60 ML/MIN/1.73
GLUCOSE BLD-MCNC: 93 MG/DL (ref 74–99)
POTASSIUM SERPL-SCNC: 3.5 MMOL/L (ref 3.5–5)
SODIUM BLD-SCNC: 139 MMOL/L (ref 132–146)
TOTAL PROTEIN: 6 G/DL (ref 6.4–8.3)

## 2021-08-28 PROCEDURE — 82330 ASSAY OF CALCIUM: CPT

## 2021-08-28 PROCEDURE — 80053 COMPREHEN METABOLIC PANEL: CPT

## 2021-08-28 PROCEDURE — 94640 AIRWAY INHALATION TREATMENT: CPT

## 2021-08-28 PROCEDURE — 6370000000 HC RX 637 (ALT 250 FOR IP): Performed by: INTERNAL MEDICINE

## 2021-08-28 PROCEDURE — 86140 C-REACTIVE PROTEIN: CPT

## 2021-08-28 PROCEDURE — 36415 COLL VENOUS BLD VENIPUNCTURE: CPT

## 2021-08-28 PROCEDURE — 2700000000 HC OXYGEN THERAPY PER DAY

## 2021-08-28 PROCEDURE — 6360000002 HC RX W HCPCS: Performed by: INTERNAL MEDICINE

## 2021-08-28 PROCEDURE — 85378 FIBRIN DEGRADE SEMIQUANT: CPT

## 2021-08-28 RX ORDER — DEXAMETHASONE 6 MG/1
6 TABLET ORAL DAILY
Qty: 5 TABLET | Refills: 0 | Status: SHIPPED | OUTPATIENT
Start: 2021-08-28 | End: 2021-09-02

## 2021-08-28 RX ORDER — ZINC SULFATE 50(220)MG
50 CAPSULE ORAL DAILY
Qty: 30 CAPSULE | Refills: 3 | COMMUNITY
Start: 2021-08-29

## 2021-08-28 RX ORDER — PYRIDOXINE HCL (VITAMIN B6) 50 MG
50 TABLET ORAL DAILY
Qty: 30 TABLET | Refills: 3 | Status: SHIPPED | OUTPATIENT
Start: 2021-08-29

## 2021-08-28 RX ORDER — IVERMECTIN 3 MG/1
15 TABLET ORAL DAILY
Qty: 25 TABLET | Refills: 0 | Status: SHIPPED | OUTPATIENT
Start: 2021-08-28 | End: 2021-08-30 | Stop reason: SINTOL

## 2021-08-28 RX ORDER — ASCORBIC ACID 500 MG
500 TABLET ORAL 2 TIMES DAILY
Qty: 30 TABLET | Refills: 3 | Status: SHIPPED | OUTPATIENT
Start: 2021-08-28

## 2021-08-28 RX ORDER — CHOLECALCIFEROL (VITAMIN D3) 50 MCG
2000 TABLET ORAL DAILY
Qty: 60 TABLET | Refills: 1 | Status: SHIPPED | OUTPATIENT
Start: 2021-08-29

## 2021-08-28 RX ADMIN — OXYCODONE HYDROCHLORIDE AND ACETAMINOPHEN 500 MG: 500 TABLET ORAL at 09:09

## 2021-08-28 RX ADMIN — ENOXAPARIN SODIUM 30 MG: 30 INJECTION SUBCUTANEOUS at 09:10

## 2021-08-28 RX ADMIN — PYRIDOXINE HCL TAB 50 MG 50 MG: 50 TAB at 09:10

## 2021-08-28 RX ADMIN — BUDESONIDE AND FORMOTEROL FUMARATE DIHYDRATE 2 PUFF: 160; 4.5 AEROSOL RESPIRATORY (INHALATION) at 06:58

## 2021-08-28 RX ADMIN — ZINC SULFATE 220 MG (50 MG) CAPSULE 50 MG: CAPSULE at 09:10

## 2021-08-28 RX ADMIN — Medication 2000 UNITS: at 09:10

## 2021-08-28 NOTE — PROGRESS NOTES
Pt IV to right hand leaking, attempted to start IV x2 without success, pt requested an IV not be put in, called Dr Darell Larry, he states he will come and see the patient.

## 2021-08-28 NOTE — PROGRESS NOTES
Pt alert/oriented x4, stable, pt denies shortness of breath at rest and with ambulation, pt discharge instructions explained, pt verbalizes understanding, pt received copies of discharge instructions, pt IV DC'd, cath in tact, telmetry removed, belongings packed, pt dressed, pt will go home with wife, pt stable.

## 2021-08-28 NOTE — PROGRESS NOTES
Attempted to administer scheduled IV Rocephin and Zithromax. After mixing medication per orders and attempting to push medication, pt yelled and stated the IV felt like 'fire. \"Less than half a mL was administered. Medication was disconnected and IV was flushed with NS. Patient stated the burning sensation was gone, but did not want to retry the Rocephin. Zithromax was connected and ran for several minutes with no complaints. Patient later pressed delaney light and stated that he could not tolerate the burning the IV solution was causing. Infusion was stopped and line was flushed x2. Patient stated burning sensation was localized to right hand. Ice pack was applied to hand. Offered to start IV in another site, to run at a slower rate, or to run with extra fluids. Patient declined at this time. Patient no longer experiencing burning discomfort at this time. No signs of infiltration or infection at site. Will notify MD in morning.

## 2021-08-28 NOTE — PROGRESS NOTES
Progress Note  Date:2021       Room:0622/0622-02  Patient Name:Abdullahi Mace     YOB: 1975     Age:46 y.o. Subjective    Subjective:  Symptoms:  Stable. He reports shortness of breath, malaise, weakness and anxiety. No cough, chest pain, headache, chest pressure, anorexia or diarrhea. Diet:  Adequate intake. Activity level: Normal.    Pain:  He reports no pain. pt is doing better. Review of Systems   Respiratory: Positive for shortness of breath. Negative for cough. Cardiovascular: Negative for chest pain. Gastrointestinal: Negative for anorexia and diarrhea. Neurological: Positive for weakness. Objective         Vitals Last 24 Hours:  TEMPERATURE:  Temp  Av.7 °F (36.5 °C)  Min: 97.2 °F (36.2 °C)  Max: 98 °F (36.7 °C)  RESPIRATIONS RANGE: Resp  Av  Min: 16  Max: 16  PULSE OXIMETRY RANGE: SpO2  Av.4 %  Min: 88 %  Max: 94 %  PULSE RANGE: Pulse  Av.7  Min: 67  Max: 72  BLOOD PRESSURE RANGE: Systolic (95ABU), VQR:922 , Min:120 , LMN:189   ; Diastolic (82XSZ), JSC:39, Min:70, Max:85    I/O (24Hr): Intake/Output Summary (Last 24 hours) at 2021 2339  Last data filed at 2021 1745  Gross per 24 hour   Intake 540 ml   Output 0 ml   Net 540 ml     Objective:  General Appearance:  Comfortable and uncomfortable. Vital signs: (most recent): Blood pressure 120/70, pulse 72, temperature 98 °F (36.7 °C), temperature source Skin, resp. rate 16, height 5' 8\" (1.727 m), weight 180 lb (81.6 kg), SpO2 94 %. Vital signs are normal.    Output: Producing urine. HEENT: Normal HEENT exam.    Lungs:  Normal respiratory rate. Heart: Regular rhythm. S1 normal and S2 normal.    Abdomen: Abdomen is soft. Bowel sounds are normal.   There is no abdominal tenderness. Extremities: Normal range of motion. Pulses: Distal pulses are intact. Neurological: Patient is alert and oriented to person, place and time. Normal strength.     Pupils:  Pupils are equal, round, and reactive to light. Skin:  Warm and dry. Labs/Imaging/Diagnostics    Labs:  CBC:  Recent Labs     08/25/21  0544 08/26/21  0628   WBC 3.0* 7.9   RBC 4.09 4.41   HGB 11.9* 12.8   HCT 35.1* 38.4   MCV 85.8 87.1   RDW 12.1 12.2    318     CHEMISTRIES:  Recent Labs     08/25/21  0544 08/26/21  0628 08/27/21  0829    138 139   K 3.8 4.0 3.2*    102 103   CO2 21* 26 25   BUN 9 12 13   CREATININE 0.7 0.7 0.7   GLUCOSE 152* 137* 136*     PT/INR:  Recent Labs     08/26/21  0628   PROTIME 12.0   INR 1.0     APTT:  Recent Labs     08/26/21  0628   APTT 25.9     LIVER PROFILE:  Recent Labs     08/26/21  0628 08/27/21  0829   AST 41* 21   ALT 65* 46*   BILITOT 0.2 0.3   ALKPHOS 54 56       Imaging Last 24 Hours:  No results found. Assessment//Plan           Hospital Problems         Last Modified POA    * (Principal) [facility] 8/25/2021 Yes    Chest pain 8/25/2021 Yes    Pneumonia due to COVID-19 virus 8/25/2021 Yes    Hyperlipidemia 8/25/2021 Yes        Assessment:  (Problem List as of 8/27/2021 Reviewed: 8/25/2021  9:42 AM by Megan Moon MD    Chest pain    Pneumonia due to COVID-19 virus    Hyperlipidemia    * (Principal) Acute hypoxemic respiratory failure due to COVID-19 [facility])    ). Plan:   (Cont with current tx).        Electronically signed by Gill Hernandez MD on 8/27/21 at 11:39 PM EDT

## 2021-08-28 NOTE — PROGRESS NOTES
(TYLENOL) suppository 650 mg  650 mg Rectal Q6H PRN Harini Thayer MD        guaiFENesin-dextromethorphan [facility] DM) 100-10 MG/5ML syrup 5 mL  5 mL Oral Q4H PRN Harini Thayer MD        Vitamin D (CHOLECALCIFEROL) tablet 2,000 Units  2,000 Units Oral Daily Harini Thayer MD   2,000 Units at 08/28/21 0910    dexamethasone (PF) (DECADRON) injection 6 mg  6 mg IntraVENous Q24H Harini Thayer MD   6 mg at 08/27/21 0924    enoxaparin (LOVENOX) injection 30 mg  30 mg Subcutaneous BID Harini Thayer MD   30 mg at 08/28/21 0910    budesonide-formoterol (SYMBICORT) 160-4.5 MCG/ACT inhaler 2 puff  2 puff Inhalation BID Harini Thayer MD   2 puff at 08/28/21 0658    atorvastatin (LIPITOR) tablet 10 mg  10 mg Oral Nightly Yevonne Boehringer, DO   10 mg at 08/27/21 2029    sodium chloride flush 0.9 % injection 5-40 mL  5-40 mL IntraVENous 2 times per day Yevonne Boehringer, DO   10 mL at 08/27/21 2033    sodium chloride flush 0.9 % injection 5-40 mL  5-40 mL IntraVENous PRN Yevonne Boehringer, DO        0.9 % sodium chloride infusion  25 mL IntraVENous PRN Yevonne Boehringer, DO        ondansetron (ZOFRAN-ODT) disintegrating tablet 4 mg  4 mg Oral Q8H PRN Yevonne Boehringer, DO        Or    ondansetron (ZOFRAN) injection 4 mg  4 mg IntraVENous Q6H PRN Yevonne Boehringer, DO        polyethylene glycol (GLYCOLAX) packet 17 g  17 g Oral Daily PRN Yevonne Boehringer, DO            LABS    CBC:     WBC   Date Value Ref Range Status   08/26/2021 7.9 4.5 - 11.5 E9/L Final   08/25/2021 3.0 (L) 4.5 - 11.5 E9/L Final   08/24/2021 5.1 4.5 - 11.5 E9/L Final     Hematocrit   Date Value Ref Range Status   08/26/2021 38.4 37.0 - 54.0 % Final   08/25/2021 35.1 (L) 37.0 - 54.0 % Final   08/24/2021 39.7 37.0 - 54.0 % Final     Platelets   Date Value Ref Range Status   08/26/2021 318 130 - 450 E9/L Final   08/25/2021 253 130 - 450 E9/L Final   08/24/2021 262 130 - 450 E9/L Final         BMP:      Sodium   Date Value Ref Range Status   08/28/2021 139 132 - 146 mmol/L Final   08/27/2021 139 132 - 146 mmol/L Final   08/26/2021 138 132 - 146 mmol/L Final     Potassium   Date Value Ref Range Status   08/28/2021 3.5 3.5 - 5.0 mmol/L Final   08/27/2021 3.2 (L) 3.5 - 5.0 mmol/L Final     Potassium reflex Magnesium   Date Value Ref Range Status   08/26/2021 4.0 3.5 - 5.0 mmol/L Final     Chloride   Date Value Ref Range Status   08/28/2021 103 98 - 107 mmol/L Final   08/27/2021 103 98 - 107 mmol/L Final   08/26/2021 102 98 - 107 mmol/L Final     CO2   Date Value Ref Range Status   08/28/2021 26 22 - 29 mmol/L Final   08/27/2021 25 22 - 29 mmol/L Final   08/26/2021 26 22 - 29 mmol/L Final     BUN   Date Value Ref Range Status   08/28/2021 14 6 - 20 mg/dL Final   08/27/2021 13 6 - 20 mg/dL Final   08/26/2021 12 6 - 20 mg/dL Final     CREATININE   Date Value Ref Range Status   08/28/2021 0.7 0.7 - 1.2 mg/dL Final   08/27/2021 0.7 0.7 - 1.2 mg/dL Final   08/26/2021 0.7 0.7 - 1.2 mg/dL Final     Glucose   Date Value Ref Range Status   08/28/2021 93 74 - 99 mg/dL Final   08/27/2021 136 (H) 74 - 99 mg/dL Final   08/26/2021 137 (H) 74 - 99 mg/dL Final         Hepatic Function Panel:    Lab Results   Component Value Date    ALKPHOS 56 08/28/2021    ALT 84 08/28/2021    AST 58 08/28/2021    PROT 6.0 08/28/2021    BILITOT 0.4 08/28/2021    LABALBU 3.0 08/28/2021        No results found for: SEDRATE    Lab Results   Component Value Date/Time    CRP 0.7 (H) 08/28/2021 07:41 AM       Microbiology :  Blood Culture, Routine   Date Value Ref Range Status   08/24/2021 24 Hours no growth  Preliminary     Culture, Blood 2   Date Value Ref Range Status   08/24/2021 24 Hours no growth  Preliminary     No results found for: LABURIN  No results found for: CULTRESP  No results found for: [facility]        Radiology :  Reviewed     ASSESSMENT:   Pneumonia due to infectious organism, unspecified laterality, unspecified part of lung [J18.9]  Pneumonia due to COVID-19 virus [U07.1, J12.82]  COVID-19 [U07.1] On treatment for   COVID PNEUMONIA   LEUKOPENIA RESOLVED    PLAN:    cefTRIAXone (ROCEPHIN) 1,000 mg in sodium chloride (PF) 10 mL IV syringe, Q24H  azithromycin (ZITHROMAX) 500 mg in D5W 250ml Vial Mate, Q24H  remdesivir 100 mg in sodium chloride 0.9 % 250 mL IVPB, Q24H day 3  vitamin B-6 (PYRIDOXINE) tablet 50 mg, Daily  ascorbic acid (VITAMIN C) tablet 500 mg, BID  zinc sulfate (ZINCATE) capsule 50 mg, Daily  Vitamin D (CHOLECALCIFEROL) tablet 2,000 Units, Daily  dexamethasone (PF) (DECADRON) injection 6 mg, Q24H  enoxaparin (LOVENOX) injection 30 mg, BID  budesonide-formoterol (SYMBICORT) 160-4.5 MCG/ACT inhaler 2 puff, BID     Encourage IS and proning    Monitor labs      MARTA Bradford NP  8/28/2021  3:00 PM    I have discussed the case, including pertinent history and physical  exam findings . I have seen and examined the patient and the key elements of the encounter have been performed by me. I agree with the assessment, plan and orders as documented.       Treatment plan as per my recommendation     Danyell Pyle MD, FACP  8/28/2021  5:21 PM

## 2021-08-28 NOTE — CARE COORDINATION
SOCIAL WORK / DISCHARGE PLANNING:  Possible dc today, qualifies for home O2, referral has been called to [facility]. O2 order needed for delivery arrangements to be made. Muriel BENEDICT aware.        Addendum: 1128am - Sw notified [facility] of possible dc, will await O2 script in Epic prior to delivery of portable tank to hospital.     Addendum: 1208pm [facility] called, O2 is arranged for delivery, unable to give ETA on delivery of portable to hospital.     Electronically signed by ANGELA Rosa on 8/28/2021 at 10:27 AM Detail Level: Detailed Quality 226: Preventive Care And Screening: Tobacco Use: Screening And Cessation Intervention: Patient screened for tobacco use, is a smoker AND received Cessation Counseling Quality 431: Preventive Care And Screening: Unhealthy Alcohol Use - Screening: Patient screened for unhealthy alcohol use using a single question and scores 2 or greater episodes per year and brief intervention occurred

## 2021-08-29 LAB
BLOOD CULTURE, ROUTINE: NORMAL
CULTURE, BLOOD 2: NORMAL

## 2021-08-30 ENCOUNTER — CARE COORDINATION (OUTPATIENT)
Dept: CASE MANAGEMENT | Age: 46
End: 2021-08-30

## 2021-08-30 NOTE — CARE COORDINATION
Ej [facility] Transitions Initial Follow Up Call    Call within 2 business days of discharge: No    Patient: Carlos Mathews Patient : 1975   MRN: [id number]  Reason for Admission: Acute hypoxemic respiratory failure d/t COVID-19+  Discharge Date: 21 RARS: Readmission Risk Score: 12      Last Discharge [facility]       Complaint Diagnosis Description Type Department Provider    21 Positive For Covid-19 COVID-19 . .. ED to Hosp-Admission (Discharged) (ADMITTED) Salma Au MD; Zohra Hogan ... Transitions of Care Initial Call    Was this an external facility discharge? No Discharge Facility: N/A    Challenges to be reviewed by the provider   Additional needs identified to be addressed with provider: No  none             Method of communication with provider : none      Advance Care Planning:   Does patient have an Advance Directive: reviewed and current. Was this a readmission? No  Patient stated reason for admission: cough, fever, chills and shortness of breath  Patients top risk factors for readmission: medical condition-PNA and acute respiratory failure and polypharmacy    Care Transition Nurse (CTN) contacted the patient by telephone to perform post hospital discharge assessment. Verified name and  with patient as identifiers. Provided introduction to self, and explanation of the CTN role. CTN reviewed discharge instructions, medical action plan and red flags with patient who verbalized understanding. Patient given an opportunity to ask questions and does not have any further questions or concerns at this time. Were discharge instructions available to patient? Yes. Reviewed appropriate site of care based on symptoms and resources available to patient including: PCP, Urgent care clinics, When to call 911 and Condition related references. The patient agrees to contact the PCP office for questions related to their healthcare.      Medication reconciliation was performed with patient, who verbalizes understanding of administration of home medications. Advised obtaining a 90-day supply of all daily and as-needed medications. Covid Risk Education     Educated patient about risk for severe COVID-19 due to risk factors according to CDC guidelines. CTN reviewed discharge instructions, medical action plan and red flag symptoms with the patient who verbalized understanding. Discussed COVID vaccination status: Yes. Education provided on COVID-19 vaccination as appropriate. Discussed exposure protocols and quarantine with CDC Guidelines. Patient was given an opportunity to verbalize any questions and concerns and agrees to contact CTN or health care provider for questions related to their healthcare. Reviewed and educated patient on any new and changed medications related to discharge diagnosis. Was patient discharged with a pulse oximeter? Yes Discussed and confirmed pulse oximeter discharge instructions and when to notify provider or seek emergency care. CTN provided contact information. Plan for follow-up call in 5-7 days based on severity of symptoms and risk factors. Plan for next call: follow up appointment-Did patient get scheudled for PCP follow up? Non-face-to-face services provided:  Scheduled appointment with PCP-CTN confirmed with patient he will schedule follow up appt with Dr. Michael Myers (PCP) and declines needing CTN assistance. Obtained and reviewed discharge summary and/or continuity of care documents  Education of patient/family/caregiver/guardian to support self-management-Discussed PNA zone tool and COVID precautions knowing when to seek medical attention. Assessment and support for treatment adherence and medication management-Advised to take Decadron as directed until completely finished. Care Transitions 24 Hour Call    Care Transitions Interventions       Spoke with patient today 8/30/21 for hospital discharge/COVID-19+ follow up. Patient states he has improved since discharge and offers no complaints. States he obtained oxygen and trying to wean off only using as needed. States saturation while on phone this CTN is 93% on room air. Denies any shortness of breath, chest pain, chest discomfort, nausea, vomiting, diarrhea, chills or fever. Provided a complete review of home meds with patient and confirmed he obtained new meds/vitamins ordered on discharge. States he stopper Ivermectin d/t side effects of cauisng nausea/vomiting and contacted PCP (Dr. Deysi Solo). Advised to take Decadron as directed until completely finished. Discussed PNA zone tool and COVID precautions knowing when to seek medical attention. States he will call and schedule follow up with Dr. Deysi Solo (PCP) and declines needing any CTN assistance. Denies any complaints or concerns at this time. CTN will continue to follow. Follow Up  No future appointments.     MARTA Sellers

## 2021-09-09 ENCOUNTER — CARE COORDINATION (OUTPATIENT)
Dept: CASE MANAGEMENT | Age: 46
End: 2021-09-09

## 2021-09-09 NOTE — CARE COORDINATION
Ej [facility] Transitions Follow Up Call    2021    Patient: Andrew Gomez  Patient : 1975   MRN: [id number]  Reason for Admission:   Discharge Date: 21 RARS: Readmission Risk Score: 12         Care Transition Nurse contacted the patient by telephone to perform follow-up assessment. Verified name and  with patient as identifiers. Spoke briefly with the patient. Patient has following risk factors of: pneumonia. Symptoms reviewed with patient who verbalized the following symptoms: Patient reports he is back to baseline.   -Patient reports he had f/u appointment with PCP on Tuesday.  -Patient reports SpO2 98%      Patient voices no needs or concerns at this time. Due to no new or worsening symptoms encounter was not routed to provider for escalation. CTN provided contact information. Plan for follow-up call in 5-7 days based on severity of symptoms and risk factors.       Gopi Tom RN

## 2021-09-17 ENCOUNTER — CARE COORDINATION (OUTPATIENT)
Dept: CASE MANAGEMENT | Age: 46
End: 2021-09-17

## 2021-09-30 NOTE — DISCHARGE SUMMARY
Discharge Summary    Date: 9/29/2021  Patient Name: April Lazaro YOB: 1975 Age: 42 y.o. Admit Date: 8/24/2021  Discharge Date: 8/28/2021  Discharge Condition: Stable    Admission Diagnosis  Hypoxia (R09.02); Pneumonia due to infectious organism, unspecified laterality, unspecified part of lung (J18.9); Pneumonia due to COVID-19 virus (U07.1, J12.82);COVID-19 (U07.1)     Discharge Diagnosis  Principal Problem: Acute hypoxemic respiratory failure due to COVID-19 (HCC)Active Problems: Chest pain Pneumonia due to COVID-19 virus  HyperlipidemiaResolved Problems:  * No resolved hospital problems. [facility] Stay  Narrative of Hospital Course:  Pt came in with covid pneumonia and respiratory failure. During hospital course he got redesivir and iv decadron and oxyge,  He cont to do well after 3 rd dose of redesivir. He was then dc home    Consultants:  [address] [place] [address] CONSULT TO PHARMACYIP CONSULT TO INFECTIOUS DISEASESIP CONSULT TO PHARMACY    Surgeries/procedures Performed:       Treatments:    Antibiotics, Steroids and Respiratory Therapy    Ceftriaxone and Azithromycin, O2, Solu-Medrol    Discharge Plan/Disposition:  Home    Hospital/Incidental Findings Requiring Follow Up:    Patient Instructions:    Diet: Regular Diet    Activity:Activity as Tolerated  For number of days (if applicable): Other Instructions:    Provider Follow-Up:   No follow-ups on file.      Significant Diagnostic Studies:    Recent Labs:  Admission on 08/24/2021, Discharged on 08/28/2021Lactic Acid, Sepsis                           Date: 08/24/2021Value: 1.1         Ref range: 0.5 - 1.9 mmol/L   Status: FinalBlood Culture, Routine                        Date: 08/24/2021Value: 5 Days no growth                     Status: FinalCulture, Blood 2                              Date: 08/24/2021Value: 5 Days no growth                     Status: FinalVentricular Rate                              Date: 08/24/2021Value: 94          Ref range: BPM                Status: FinalAtrial Rate                                   Date: 08/24/2021Value: 94          Ref range: BPM                Status: FinalP-R Interval                                  Date: 08/24/2021Value: 120         Ref range: ms                 Status: FinalQRS Duration                                  Date: 08/24/2021Value: 76          Ref range: ms                 Status: FinalQ-T Interval                                  Date: 08/24/2021Value: 366         Ref range: ms                 Status: FinalQTc Calculation (Bazett)                      Date: 08/24/2021Value: 457         Ref range: ms                 Status: FinalP Axis                                        Date: 08/24/2021Value: 33          Ref range: degrees            Status: FinalR Axis                                        Date: 08/24/2021Value: -4          Ref range: degrees            Status: FinalT Axis                                        Date: 08/24/2021Value: 8           Ref range: degrees            Status: [address] [facility]                                           Date: 08/24/2021Value: 5.1         Ref range: 4.5 - 11.5 E9/L    Status: FinalRBC                                           Date: 08/24/2021Value: 4.66        Ref range: 3.80 - 5.80 E12/L  Status: FinalHemoglobin                                    Date: 08/24/2021Value: 13.7        Ref range: 12.5 - 16.5 g/dL   Status: FinalHematocrit                                    Date: 08/24/2021Value: 39.7        Ref range: 37.0 - 54.0 %      Status: FinalMCV                                           Date: 08/24/2021Value: 85.2        Ref range: 80.0 - 99.9 fL     Status: [address]                                           Date: 08/24/2021Value: 29.4        Ref range: 26.0 - 35.0 pg     Status: [address]                                          Date: 08/24/2021Value: 34.5        Ref range: 32.0 - 34.5 %      Status: FinalRDW Date: 08/24/2021Value: 12.1        Ref range: 11.5 - 15.0 fL     Status: FinalPlatelets                                     Date: 08/24/2021Value: 262         Ref range: 130 - 450 E9/L     Status: FinalMPV                                           Date: 08/24/2021Value: 9.7         Ref range: 7.0 - 12.0 fL      Status: FinalNeutrophils %                                 Date: 08/24/2021Value: 92.2*       Ref range: 43.0 - 80.0 %      Status: FinalLymphocytes %                                 Date: 08/24/2021Value: 3.5*        Ref range: 20.0 - 42.0 %      Status: FinalMonocytes %                                   Date: 08/24/2021Value: 4.3         Ref range: 2.0 - 12.0 %       Status: FinalEosinophils %                                 Date: 08/24/2021Value: 0.0         Ref range: 0.0 - 6.0 %        Status: FinalBasophils %                                   Date: 08/24/2021Value: 0.2         Ref range: 0.0 - 2.0 %        Status: FinalNeutrophils Absolute                          Date: 08/24/2021Value: 4.69        Ref range: 1.80 - 7.30 E9/L   Status: FinalLymphocytes Absolute                          Date: 08/24/2021Value: 0.20*       Ref range: 1.50 - 4.00 E9/L   Status: FinalMonocytes Absolute                            Date: 08/24/2021Value: 0.20        Ref range: 0.10 - 0.95 E9/L   Status: FinalEosinophils Absolute                          Date: 08/24/2021Value: 0.00*       Ref range: 0.05 - 0.50 E9/L   Status: FinalBasophils Absolute                            Date: 08/24/2021Value: 0.00        Ref range: 0.00 - 0.20 E9/L   Status: FinalDohle Bodies                                  Date: 08/24/2021Value: 1+            Status: FinalPoikilocytes                                  Date: 08/24/2021Value: 1+            Status: FinalBurr Cells                                    Date: 08/24/2021Value: 1+            Status: FinalOvalocytes                                    Date: 08/24/2021Value: 1+            Status: FinalSpherocytes                                   Date: 08/24/2021Value: 1+            Status: FinalSodium                                        Date: 08/24/2021Value: 135         Ref range: 132 - 146 mmol/L   Status: FinalPotassium                                     Date: 08/24/2021Value: 3.7         Ref range: 3.5 - 5.0 mmol/L   Status: FinalChloride                                      Date: 08/24/2021Value: 97*         Ref range: 98 - 107 mmol/L    Status: FinalCO2                                           Date: 08/24/2021Value: 26          Ref range: 22 - 29 mmol/L     Status: FinalAnion Gap                                     Date: 08/24/2021Value: 12          Ref range: 7 - 16 mmol/L      Status: FinalGlucose                                       Date: 08/24/2021Value: 111*        Ref range: 74 - 99 mg/dL      Status: FinalBUN                                           Date: 08/24/2021Value: 8           Ref range: 6 - 20 mg/dL       Status: FinalCREATININE                                    Date: 08/24/2021Value: 0.8         Ref range: 0.7 - 1.2 mg/dL    Status: FinalGFR Non-                      Date: 08/24/2021Value: >60         Ref range: >=60 mL/min/1.73   Status: Final              Comment: Chronic Kidney Disease: less than 60 ml/min/1.73 sq.m. Kidney Failure: less than 15 ml/min/1.73 sq. m. Results valid for patients 18 years and older. GFR                           Date: 08/24/2021Value: >60           Status: FinalCalcium                                       Date: 08/24/2021Value: 8.3*        Ref range: 8.6 - 10.2 mg/dL   Status: FinalTotal Protein                                 Date: 08/24/2021Value: 6.5         Ref range: 6.4 - 8.3 g/dL     Status: FinalAlbumin                                       Date: 08/24/2021Value: 3.6         Ref range: 3.5 - 5.2 g/dL     Status: FinalTotal Bilirubin Date: 08/25/2021Value: 253         Ref range: 130 - 450 E9/L     Status: FinalMPV                                           Date: 08/25/2021Value: 9.7         Ref range: 7.0 - 12.0 fL      Status: FinalProcalcitonin                                 Date: 08/25/2021Value: 0.14*       Ref range: 0.00 - 0.08 ng/mL  Status: Final              Comment: Suspected Sepsis:Low likelihood of sepsis  <.50 ng/mLIncreased likelihood of sepsis 0.50-2.00 ng/mLAntibiotics encouragedHigh risk of sepsis/shock   >2.00 ng/mLAntibiotics strongly encouragedSuspected Lower Respiratory Tract Infections:Low likelihood of bacterial infection  <0.24 ng/mLIncreased likelihood of bacterial infection >0.24 ng/mLAntibiotics encouragedWith successful antibiotic therapy, PCT levels should decreaserapidly. (Half-life of 24 to 36 hours. )Procalcitonin values from samples collected within the first6 hours of systemic infection may still be low. Retesting may be indicated. Values from day 1 and day 4 can be entered into the Change inProcalcitonin Calculator to determine the patient'sMortality Risk http://www.[place]in[place].info/. com)In healthy neonates, plasma Procalcitonin (PCT) concentrationsincrease gradually after birth, reaching peak values at about24 hours of age then decrease to normal values below 0.5                        ng/mLby 48-72 hours of age. CRP                                           Date: 08/25/2021Value: 4.2*        Ref range: 0.0 - 0.4 mg/dL    Status: FinalHemoglobin A1C                                Date: 08/26/2021Value: 5.6         Ref range: 4.0 - 5.6 %        Status: FinalCholesterol, Total                            Date: 08/26/2021Value: 140         Ref range: 0 - 199 mg/dL      Status: FinalTriglycerides                                 Date: 08/26/2021Value: 124         Ref range: 0 - 149 mg/dL      Status: FinalHDL                                           Date: 08/26/2021Value: 40          Ref range: >40 mg/dL Status: FinalLDL Calculated                                Date: 08/26/2021Value: 75          Ref range: 0 - 99 mg/dL       Status: FinalVLDL Cholesterol Calculated                   Date: 08/26/2021Value: 25          Ref range: mg/dL              Status: FinalVit D, 25-Hydroxy                             Date: 08/26/2021Value: 20*         Ref range: 30 - 100 ng/mL     Status: Final              Comment: <20 ng/mL. ........... Pamalee Bucker Etvkzwqml48-89 ng/mL. ......... Pamalee Bucker Oxmdqbfsasqs62-473 ng/mL. ........ Pamalee Bucker Sufficient>100 ng/mL. .......... Pamalee Bucker ToxicLactic Acid                                   Date: 08/26/2021Value: 1.0         Ref range: 0.5 - 2.2 mmol/L   Status: FinalTroponin, High Sensitivity                    Date: 08/26/2021Value: <6          Ref range: 0 - 11 ng/L        Status: Final              Comment: High Sensitivity Troponin values cannot be compared withother Troponin methodologies. Patients with high levels of Biotin oral intake (i.e. >5 mg/day)may have falsely decreased Troponin levels. Samples collectedwithin 8 hours of biotin intake may require additional informationfor diagnosis. D-Dimer, Quant                                Date: 08/26/2021Value: 311         Ref range: ng/mL DDU          Status: Final              Comment: D-DIMER Interpretation:<  230  ng/mL (D-DU)  Indicates low probability for PE/DVT = 232  ng/mL (D-DU)  Upper Limit of Normal>= 4000 ng/mL (D-DU)  This level could suggest the presence                      of DIC. Clinical correlation may be                      helpful. Ferritin                                      Date: 08/26/2021Value: 1,985       Ref range: ng/mL              Status: Final              Comment: FERRITIN Reference Ranges:Adult Males   20 - 60 years:    27 - 400 ng/mLAdult females 17 - 60 years:    13 - 150 ng/mLAdults greater than 60 years:   no established reference rangePediatrics:                     no established reference rangeLD Date: 08/26/2021Value: 439*        Ref range: 135 - 225 U/L      Status: FinalCRP                                           Date: 08/26/2021Value: 2.8*        Ref range: 0.0 - 0.4 mg/dL    Status: FinalaPTT                                          Date: 08/26/2021Value: 25.9        Ref range: 24.5 - 35.1 sec    Status: FinalSodium                                        Date: 08/26/2021Value: 138         Ref range: 132 - 146 mmol/L   Status: FinalPotassium reflex Magnesium                    Date: 08/26/2021Value: 4.0         Ref range: 3.5 - 5.0 mmol/L   Status: FinalChloride                                      Date: 08/26/2021Value: 102         Ref range: 98 - 107 mmol/L    Status: FinalCO2                                           Date: 08/26/2021Value: 26          Ref range: 22 - 29 mmol/L     Status: FinalAnion Gap                                     Date: 08/26/2021Value: 10          Ref range: 7 - 16 mmol/L      Status: FinalGlucose                                       Date: 08/26/2021Value: 137*        Ref range: 74 - 99 mg/dL      Status: FinalBUN                                           Date: 08/26/2021Value: 12          Ref range: 6 - 20 mg/dL       Status: FinalCREATININE                                    Date: 08/26/2021Value: 0.7         Ref range: 0.7 - 1.2 mg/dL    Status: FinalGFR Non-                      Date: 08/26/2021Value: >60         Ref range: >=60 mL/min/1.73   Status: Final              Comment: Chronic Kidney Disease: less than 60 ml/min/1.73 sq.m. Kidney Failure: less than 15 ml/min/1.73 sq. m. Results valid for patients 18 years and older. GFR                           Date: 08/26/2021Value: >60           Status: FinalCalcium                                       Date: 08/26/2021Value: 8.4*        Ref range: 8.6 - 10.2 mg/dL   Status: FinalTotal Protein                                 Date: 08/26/2021Value: 6.4         Ref range: 6.4 - 8.3 g/dL Status: FinalAlbumin                                       Date: 08/26/2021Value: 3.2*        Ref range: 3.5 - 5.2 g/dL     Status: FinalTotal Bilirubin                               Date: 08/26/2021Value: 0.2         Ref range: 0.0 - 1.2 mg/dL    Status: FinalAlkaline Phosphatase                          Date: 08/26/2021Value: 54          Ref range: 40 - 129 U/L       Status: FinalALT                                           Date: 08/26/2021Value: 65*         Ref range: 0 - 40 U/L         Status: FinalAST                                           Date: 08/26/2021Value: 41*         Ref range: 0 - 39 U/L         Status: [address] [facility]                                           Date: 08/26/2021Value: 7.9         Ref range: 4.5 - 11.5 E9/L    Status: FinalRBC                                           Date: 08/26/2021Value: 4.41        Ref range: 3.80 - 5.80 E12/L  Status: FinalHemoglobin                                    Date: 08/26/2021Value: 12.8        Ref range: 12.5 - 16.5 g/dL   Status: FinalHematocrit                                    Date: 08/26/2021Value: 38.4        Ref range: 37.0 - 54.0 %      Status: FinalMCV                                           Date: 08/26/2021Value: 87.1        Ref range: 80.0 - 99.9 fL     Status: [address] [place]                                           Date: 08/26/2021Value: 29.0        Ref range: 26.0 - 35.0 pg     Status: [address]                                          Date: 08/26/2021Value: 33.3        Ref range: 32.0 - 34.5 %      Status: FinalRDW                                           Date: 08/26/2021Value: 12.2        Ref range: 11.5 - 15.0 fL     Status: FinalPlatelets                                     Date: 08/26/2021Value: 318         Ref range: 130 - 450 E9/L     Status: FinalMPV                                           Date: 08/26/2021Value: 9.7         Ref range: 7.0 - 12.0 fL      Status: FinalNeutrophils %                                 Date: 08/26/2021Value: 95.7* Ref range: 43.0 - 80.0 %      Status: FinalLymphocytes %                                 Date: 08/26/2021Value: 2.6*        Ref range: 20.0 - 42.0 %      Status: FinalMonocytes %                                   Date: 08/26/2021Value: 1.7*        Ref range: 2.0 - 12.0 %       Status: FinalEosinophils %                                 Date: 08/26/2021Value: 0.0         Ref range: 0.0 - 6.0 %        Status: FinalBasophils %                                   Date: 08/26/2021Value: 0.0         Ref range: 0.0 - 2.0 %        Status: FinalNeutrophils Absolute                          Date: 08/26/2021Value: 7.58*       Ref range: 1.80 - 7.30 E9/L   Status: FinalLymphocytes Absolute                          Date: 08/26/2021Value: 0.24*       Ref range: 1.50 - 4.00 E9/L   Status: FinalMonocytes Absolute                            Date: 08/26/2021Value: 0.16        Ref range: 0.10 - 0.95 E9/L   Status: FinalEosinophils Absolute                          Date: 08/26/2021Value: 0.00*       Ref range: 0.05 - 0.50 E9/L   Status: FinalBasophils Absolute                            Date: 08/26/2021Value: 0.00        Ref range: 0.00 - 0.20 E9/L   Status: FinalPolychromasia                                 Date: 08/26/2021Value: 1+            Status: FinalPoikilocytes                                  Date: 08/26/2021Value: 1+            Status: FinalBurr Cells                                    Date: 08/26/2021Value: 1+            Status: FinalProtime                                       Date: 08/26/2021Value: 12.0        Ref range: 9.3 - 12.4 sec     Status: FinalINR                                           Date: 08/26/2021Value: 1.0           Status: FinalFibrinogen                                    Date: 08/26/2021Value: >700*       Ref range: 225 - 540 mg/dL    Status: FinalProcalcitonin                                 Date: 08/26/2021Value: 0.11*       Ref range: 0.00 - 0.08 ng/mL  Status: Final              Comment: Suspected Sepsis:Low likelihood of sepsis  <.50 ng/mLIncreased likelihood of sepsis 0.50-2.00 ng/mLAntibiotics encouragedHigh risk of sepsis/shock   >2.00 ng/mLAntibiotics strongly encouragedSuspected Lower Respiratory Tract Infections:Low likelihood of bacterial infection  <0.24 ng/mLIncreased likelihood of bacterial infection >0.24 ng/mLAntibiotics encouragedWith successful antibiotic therapy, PCT levels should decreaserapidly. (Half-life of 24 to 36 hours. )Procalcitonin values from samples collected within the first6 hours of systemic infection may still be low. Retesting may be indicated. Values from day 1 and day 4 can be entered into the Change inProcalcitonin Calculator to determine the patient'sMortality Risk http://www.[place].info/. com)In healthy neonates, plasma Procalcitonin (PCT) concentrationsincrease gradually after birth, reaching peak values at about24 hours of age then decrease to normal values below 0.5                        ng/mLby 48-72 hours of age. D-Dimer, Quant                                Date: 08/27/2021Value: 334         Ref range: ng/mL DDU          Status: Final              Comment: D-DIMER Interpretation:<  230  ng/mL (D-DU)  Indicates low probability for PE/DVT = 232  ng/mL (D-DU)  Upper Limit of Normal>= 4000 ng/mL (D-DU)  This level could suggest the presence                      of DIC. Clinical correlation may be                      helpful. CRP                                           Date: 08/27/2021Value: 1.3*        Ref range: 0.0 - 0.4 mg/dL    Status: FinalSodium                                        Date: 08/27/2021Value: 139         Ref range: 132 - 146 mmol/L   Status: FinalPotassium                                     Date: 08/27/2021Value: 3.2*        Ref range: 3.5 - 5.0 mmol/L   Status: FinalChloride                                      Date: 08/27/2021Value: 103         Ref range: 98 - 107 mmol/L    Status: Arithmatica Date: 08/27/2021Value: 25          Ref range: 22 - 29 mmol/L     Status: FinalAnion Gap                                     Date: 08/27/2021Value: 11          Ref range: 7 - 16 mmol/L      Status: FinalGlucose                                       Date: 08/27/2021Value: 136*        Ref range: 74 - 99 mg/dL      Status: FinalBUN                                           Date: 08/27/2021Value: 13          Ref range: 6 - 20 mg/dL       Status: FinalCREATININE                                    Date: 08/27/2021Value: 0.7         Ref range: 0.7 - 1.2 mg/dL    Status: FinalGFR Non-                      Date: 08/27/2021Value: >60         Ref range: >=60 mL/min/1.73   Status: Final              Comment: Chronic Kidney Disease: less than 60 ml/min/1.73 sq.m. Kidney Failure: less than 15 ml/min/1.73 sq. m. Results valid for patients 18 years and older. GFR                           Date: 08/27/2021Value: >60           Status: FinalCalcium                                       Date: 08/27/2021Value: 8.2*        Ref range: 8.6 - 10.2 mg/dL   Status: FinalTotal Protein                                 Date: 08/27/2021Value: 5.6*        Ref range: 6.4 - 8.3 g/dL     Status: FinalAlbumin                                       Date: 08/27/2021Value: 3.0*        Ref range: 3.5 - 5.2 g/dL     Status: FinalTotal Bilirubin                               Date: 08/27/2021Value: 0.3         Ref range: 0.0 - 1.2 mg/dL    Status: FinalAlkaline Phosphatase                          Date: 08/27/2021Value: 56          Ref range: 40 - 129 U/L       Status: FinalALT                                           Date: 08/27/2021Value: 46*         Ref range: 0 - 40 U/L         Status: FinalAST                                           Date: 08/27/2021Value: 21          Ref range: 0 - 39 U/L         Status: FinalProcalcitonin                                 Date: 08/27/2021Value: 0.05        Ref range: 0.00 - 0.08 ng/mL  Status: Final              Comment: Suspected Sepsis:Low likelihood of sepsis  <.50 ng/mLIncreased likelihood of sepsis 0.50-2.00 ng/mLAntibiotics encouragedHigh risk of sepsis/shock   >2.00 ng/mLAntibiotics strongly encouragedSuspected Lower Respiratory Tract Infections:Low likelihood of bacterial infection  <0.24 ng/mLIncreased likelihood of bacterial infection >0.24 ng/mLAntibiotics encouragedWith successful antibiotic therapy, PCT levels should decreaserapidly. (Half-life of 24 to 36 hours. )Procalcitonin values from samples collected within the first6 hours of systemic infection may still be low. Retesting may be indicated. Values from day 1 and day 4 can be entered into the Change inProcalcitonin Calculator to determine the patient'sMortality Risk http://www.[place].info/. com)In healthy neonates, plasma Procalcitonin (PCT) concentrationsincrease gradually after birth, reaching peak values at about24 hours of age then decrease to normal values below 0.5                        ng/mLby 48-72 hours of age. D-Dimer, Quant                                Date: 08/28/2021Value: 309         Ref range: ng/mL DDU          Status: Final              Comment: D-DIMER Interpretation:<  230  ng/mL (D-DU)  Indicates low probability for PE/DVT = 232  ng/mL (D-DU)  Upper Limit of Normal>= 4000 ng/mL (D-DU)  This level could suggest the presence                      of DIC. Clinical correlation may be                      helpful. CRP                                           Date: 08/28/2021Value: 0.7*        Ref range: 0.0 - 0.4 mg/dL    Status: FinalSodium                                        Date: 08/28/2021Value: 139         Ref range: 132 - 146 mmol/L   Status: FinalPotassium                                     Date: 08/28/2021Value: 3.5         Ref range: 3.5 - 5.0 mmol/L   Status: FinalChloride                                      Date: 08/28/2021Value: 103         Ref range: 98 - 107 mmol/L    Status: FinalCO2                                           Date: 08/28/2021Value: 26          Ref range: 22 - 29 mmol/L     Status: FinalAnion Gap                                     Date: 08/28/2021Value: 10          Ref range: 7 - 16 mmol/L      Status: FinalGlucose                                       Date: 08/28/2021Value: 93          Ref range: 74 - 99 mg/dL      Status: FinalBUN                                           Date: 08/28/2021Value: 14          Ref range: 6 - 20 mg/dL       Status: FinalCREATININE                                    Date: 08/28/2021Value: 0.7         Ref range: 0.7 - 1.2 mg/dL    Status: FinalGFR Non-                      Date: 08/28/2021Value: >60         Ref range: >=60 mL/min/1.73   Status: Final              Comment: Chronic Kidney Disease: less than 60 ml/min/1.73 sq.m. Kidney Failure: less than 15 ml/min/1.73 sq. m. Results valid for patients 18 years and older. GFR                           Date: 08/28/2021Value: >60           Status: FinalCalcium                                       Date: 08/28/2021Value: 8.2*        Ref range: 8.6 - 10.2 mg/dL   Status: FinalTotal Protein                                 Date: 08/28/2021Value: 6.0*        Ref range: 6.4 - 8.3 g/dL     Status: FinalAlbumin                                       Date: 08/28/2021Value: 3.0*        Ref range: 3.5 - 5.2 g/dL     Status: FinalTotal Bilirubin                               Date: 08/28/2021Value: 0.4         Ref range: 0.0 - 1.2 mg/dL    Status: FinalAlkaline Phosphatase                          Date: 08/28/2021Value: 56          Ref range: 40 - 129 U/L       Status: FinalALT                                           Date: 08/28/2021Value: 84*         Ref range: 0 - 40 U/L         Status: FinalAST                                           Date: 08/28/2021Value: 58*         Ref range: 0 - 39 U/L         Status: FinalCalcium, Ion                                  Date: 08/28/2021Value: 1.22        Ref range: 1.15 - 1.33 mmol*  Status: Final------------    Radiology last 7 days:  No results found. [unfilled]    Discharge Medications    Discharge Medication List as of 8/28/2021 12:20 PMSTART taking these medicationszinc sulfate (ZINCATE) 220 (50 Zn) MG capsuleTake 1 capsule by mouth daily, Disp-30 capsule, R-3OTCascorbic acid (VITAMIN C) 500 MG tabletTake 1 tablet by mouth 2 times daily, Disp-30 tablet, R-3Normalvitamin B-6 (B-6) 50 MG tabletTake 1 tablet by mouth daily, Disp-30 tablet, R-3NormalVitamin D (CHOLECALCIFEROL) 50 MCG (2000 UT) TABS tabletTake 1 tablet by mouth daily, Disp-60 tablet, R-1Labeling may look different. 25 mcg=1000 Units. Please double check dosages. Normalivermectin 3 MG tabletTake 5 tablets by mouth daily, Disp-25 tablet, R-0Normal    Discharge Medication List as of 8/28/2021 12:20 PMCONTINUE these medications which have CHANGEDdexamethasone (DECADRON) 6 MG tabletTake 1 tablet by mouth daily for 5 days, Disp-5 tablet, R-0Normal    Discharge Medication List as of 8/28/2021 12:20 Gwendolynn Rummage these medications which have NOT CHANGEDatorvastatin (LIPITOR) 10 MG tabletTake 10 mg by mouth dailyHistorical Med    Discharge Medication List as of 8/28/2021 12:20 PMSTOP taking these medicationsazithromycin (Donalynn Nova) 250 MG tabletComments:Reason for Stopping:    Time Spent on Discharge:E] minutes were spent in patient examination, evaluation, counseling as well as medication reconciliation, prescriptions for required medications, discharge plan, and follow up.     Electronically signed by Lois Skinner MD on 9/29/21 at 10:30 PM EDT

## 2021-10-07 ENCOUNTER — TELEPHONE (OUTPATIENT)
Dept: BARIATRICS/WEIGHT MGMT | Age: 46
End: 2021-10-07

## 2021-10-07 DIAGNOSIS — Z00.8 NUTRITIONAL ASSESSMENT: ICD-10-CM

## 2021-10-07 DIAGNOSIS — Z71.3 NUTRITIONAL COUNSELING: Primary | ICD-10-CM

## 2021-10-07 NOTE — LETTER
[facility]   [address] [facility]  Phone: [phone]  Fax: [phone]    Qiana Hernández RD, SAMERE        October 7, 2021    Lucila Boris  [address] [place] [id number]      Dear Jeffery Chang:    We left a message for you on 9/30/21 to complete surgery scheduling but have not heard back from you. We tried calling you again on 10/7/21 to complete surgery scheduling but your mail box is full. Please contact the [facility] at [phone] to complete the surgery scheduling process. We are also emailed you in [address] [place] [address] [phone] asking for you to contact the [facility].     Sincerely,        Qiana Hernández RD, SAMEER

## 2021-10-07 NOTE — TELEPHONE ENCOUNTER
Sergio Owen called pt on 9/30/21 and LM. Sergio Owen called pt on 10/7/21 to complete sx scheduling and pts mailbox is full. Serigo Owen emailed pt in [place] [facility] - Dear Mariusz Mendez, We left a message for you on 9/30/21 to complete surgery scheduling but have not heard back from you. We tried calling you again on 10/7/21 to complete surgery scheduling but your mail box is full. Please contact the [facility] at [phone] to complete the surgery scheduling process. We are also mailing you a letter asking for you to contact the [facility]. See attached letter.